# Patient Record
Sex: FEMALE | Race: BLACK OR AFRICAN AMERICAN | NOT HISPANIC OR LATINO
[De-identification: names, ages, dates, MRNs, and addresses within clinical notes are randomized per-mention and may not be internally consistent; named-entity substitution may affect disease eponyms.]

---

## 2019-05-01 ENCOUNTER — TRANSCRIPTION ENCOUNTER (OUTPATIENT)
Age: 64
End: 2019-05-01

## 2019-08-01 ENCOUNTER — OUTPATIENT (OUTPATIENT)
Dept: OUTPATIENT SERVICES | Facility: HOSPITAL | Age: 64
LOS: 1 days | End: 2019-08-01
Payer: MEDICAID

## 2019-08-01 DIAGNOSIS — Z98.51 TUBAL LIGATION STATUS: Chronic | ICD-10-CM

## 2019-08-01 PROCEDURE — G9001: CPT

## 2019-08-23 ENCOUNTER — EMERGENCY (EMERGENCY)
Facility: HOSPITAL | Age: 64
LOS: 1 days | Discharge: HOME | End: 2019-08-23
Attending: EMERGENCY MEDICINE
Payer: COMMERCIAL

## 2019-08-23 ENCOUNTER — APPOINTMENT (OUTPATIENT)
Dept: CT IMAGING | Facility: CLINIC | Age: 64
End: 2019-08-23

## 2019-08-23 VITALS
WEIGHT: 126.1 LBS | HEIGHT: 67 IN | OXYGEN SATURATION: 100 % | SYSTOLIC BLOOD PRESSURE: 171 MMHG | TEMPERATURE: 99 F | DIASTOLIC BLOOD PRESSURE: 83 MMHG | HEART RATE: 101 BPM | RESPIRATION RATE: 20 BRPM

## 2019-08-23 DIAGNOSIS — Z98.51 TUBAL LIGATION STATUS: Chronic | ICD-10-CM

## 2019-08-23 LAB
ALBUMIN SERPL ELPH-MCNC: 4.4 G/DL — SIGNIFICANT CHANGE UP (ref 3.3–5.2)
ALP SERPL-CCNC: 67 U/L — SIGNIFICANT CHANGE UP (ref 40–120)
ALT FLD-CCNC: 12 U/L — SIGNIFICANT CHANGE UP
ANION GAP SERPL CALC-SCNC: 17 MMOL/L — SIGNIFICANT CHANGE UP (ref 5–17)
APPEARANCE UR: CLEAR — SIGNIFICANT CHANGE UP
AST SERPL-CCNC: 19 U/L — SIGNIFICANT CHANGE UP
BACTERIA # UR AUTO: NEGATIVE — SIGNIFICANT CHANGE UP
BASOPHILS # BLD AUTO: 0.03 K/UL — SIGNIFICANT CHANGE UP (ref 0–0.2)
BASOPHILS NFR BLD AUTO: 0.3 % — SIGNIFICANT CHANGE UP (ref 0–2)
BILIRUB SERPL-MCNC: 0.5 MG/DL — SIGNIFICANT CHANGE UP (ref 0.4–2)
BILIRUB UR-MCNC: NEGATIVE — SIGNIFICANT CHANGE UP
BUN SERPL-MCNC: 10 MG/DL — SIGNIFICANT CHANGE UP (ref 8–20)
CALCIUM SERPL-MCNC: 9.9 MG/DL — SIGNIFICANT CHANGE UP (ref 8.6–10.2)
CHLORIDE SERPL-SCNC: 98 MMOL/L — SIGNIFICANT CHANGE UP (ref 98–107)
CO2 SERPL-SCNC: 24 MMOL/L — SIGNIFICANT CHANGE UP (ref 22–29)
COLOR SPEC: YELLOW — SIGNIFICANT CHANGE UP
CREAT SERPL-MCNC: 0.68 MG/DL — SIGNIFICANT CHANGE UP (ref 0.5–1.3)
DIFF PNL FLD: ABNORMAL
EOSINOPHIL # BLD AUTO: 0.13 K/UL — SIGNIFICANT CHANGE UP (ref 0–0.5)
EOSINOPHIL NFR BLD AUTO: 1.2 % — SIGNIFICANT CHANGE UP (ref 0–6)
EPI CELLS # UR: SIGNIFICANT CHANGE UP
GLUCOSE SERPL-MCNC: 97 MG/DL — SIGNIFICANT CHANGE UP (ref 70–115)
GLUCOSE UR QL: NEGATIVE MG/DL — SIGNIFICANT CHANGE UP
HCG UR QL: NEGATIVE — SIGNIFICANT CHANGE UP
HCT VFR BLD CALC: 37.8 % — SIGNIFICANT CHANGE UP (ref 34.5–45)
HGB BLD-MCNC: 12.1 G/DL — SIGNIFICANT CHANGE UP (ref 11.5–15.5)
IMM GRANULOCYTES NFR BLD AUTO: 0.7 % — SIGNIFICANT CHANGE UP (ref 0–1.5)
KETONES UR-MCNC: NEGATIVE — SIGNIFICANT CHANGE UP
LEUKOCYTE ESTERASE UR-ACNC: NEGATIVE — SIGNIFICANT CHANGE UP
LIDOCAIN IGE QN: 18 U/L — LOW (ref 22–51)
LYMPHOCYTES # BLD AUTO: 1.48 K/UL — SIGNIFICANT CHANGE UP (ref 1–3.3)
LYMPHOCYTES # BLD AUTO: 13.9 % — SIGNIFICANT CHANGE UP (ref 13–44)
MCHC RBC-ENTMCNC: 29.4 PG — SIGNIFICANT CHANGE UP (ref 27–34)
MCHC RBC-ENTMCNC: 32 GM/DL — SIGNIFICANT CHANGE UP (ref 32–36)
MCV RBC AUTO: 91.7 FL — SIGNIFICANT CHANGE UP (ref 80–100)
MONOCYTES # BLD AUTO: 0.8 K/UL — SIGNIFICANT CHANGE UP (ref 0–0.9)
MONOCYTES NFR BLD AUTO: 7.5 % — SIGNIFICANT CHANGE UP (ref 2–14)
NEUTROPHILS # BLD AUTO: 8.13 K/UL — HIGH (ref 1.8–7.4)
NEUTROPHILS NFR BLD AUTO: 76.4 % — SIGNIFICANT CHANGE UP (ref 43–77)
NITRITE UR-MCNC: NEGATIVE — SIGNIFICANT CHANGE UP
PH UR: 6.5 — SIGNIFICANT CHANGE UP (ref 5–8)
PLATELET # BLD AUTO: 265 K/UL — SIGNIFICANT CHANGE UP (ref 150–400)
POTASSIUM SERPL-MCNC: 3.5 MMOL/L — SIGNIFICANT CHANGE UP (ref 3.5–5.3)
POTASSIUM SERPL-SCNC: 3.5 MMOL/L — SIGNIFICANT CHANGE UP (ref 3.5–5.3)
PROT SERPL-MCNC: 8.1 G/DL — SIGNIFICANT CHANGE UP (ref 6.6–8.7)
PROT UR-MCNC: 30 MG/DL
RBC # BLD: 4.12 M/UL — SIGNIFICANT CHANGE UP (ref 3.8–5.2)
RBC # FLD: 12.3 % — SIGNIFICANT CHANGE UP (ref 10.3–14.5)
RBC CASTS # UR COMP ASSIST: ABNORMAL /HPF (ref 0–4)
SODIUM SERPL-SCNC: 139 MMOL/L — SIGNIFICANT CHANGE UP (ref 135–145)
SP GR SPEC: 1 — LOW (ref 1.01–1.02)
UROBILINOGEN FLD QL: NEGATIVE MG/DL — SIGNIFICANT CHANGE UP
WBC # BLD: 10.64 K/UL — HIGH (ref 3.8–10.5)
WBC # FLD AUTO: 10.64 K/UL — HIGH (ref 3.8–10.5)
WBC UR QL: SIGNIFICANT CHANGE UP

## 2019-08-23 PROCEDURE — 74177 CT ABD & PELVIS W/CONTRAST: CPT | Mod: 26

## 2019-08-23 PROCEDURE — 99284 EMERGENCY DEPT VISIT MOD MDM: CPT | Mod: 25

## 2019-08-23 PROCEDURE — 99284 EMERGENCY DEPT VISIT MOD MDM: CPT

## 2019-08-23 PROCEDURE — 83690 ASSAY OF LIPASE: CPT

## 2019-08-23 PROCEDURE — 80053 COMPREHEN METABOLIC PANEL: CPT

## 2019-08-23 PROCEDURE — 81001 URINALYSIS AUTO W/SCOPE: CPT

## 2019-08-23 PROCEDURE — 36415 COLL VENOUS BLD VENIPUNCTURE: CPT

## 2019-08-23 PROCEDURE — 96360 HYDRATION IV INFUSION INIT: CPT

## 2019-08-23 PROCEDURE — 74177 CT ABD & PELVIS W/CONTRAST: CPT

## 2019-08-23 PROCEDURE — 81025 URINE PREGNANCY TEST: CPT

## 2019-08-23 PROCEDURE — 96361 HYDRATE IV INFUSION ADD-ON: CPT

## 2019-08-23 PROCEDURE — 85027 COMPLETE CBC AUTOMATED: CPT

## 2019-08-23 RX ORDER — OXYCODONE AND ACETAMINOPHEN 5; 325 MG/1; MG/1
1 TABLET ORAL
Qty: 6 | Refills: 0
Start: 2019-08-23 | End: 2019-08-25

## 2019-08-23 RX ORDER — METRONIDAZOLE 500 MG
500 TABLET ORAL ONCE
Refills: 0 | Status: COMPLETED | OUTPATIENT
Start: 2019-08-23 | End: 2019-08-23

## 2019-08-23 RX ORDER — METRONIDAZOLE 500 MG
1 TABLET ORAL
Qty: 30 | Refills: 0
Start: 2019-08-23 | End: 2019-09-01

## 2019-08-23 RX ORDER — SODIUM CHLORIDE 9 MG/ML
1000 INJECTION INTRAMUSCULAR; INTRAVENOUS; SUBCUTANEOUS ONCE
Refills: 0 | Status: COMPLETED | OUTPATIENT
Start: 2019-08-23 | End: 2019-08-23

## 2019-08-23 RX ADMIN — SODIUM CHLORIDE 2000 MILLILITER(S): 9 INJECTION INTRAMUSCULAR; INTRAVENOUS; SUBCUTANEOUS at 15:19

## 2019-08-23 RX ADMIN — SODIUM CHLORIDE 1000 MILLILITER(S): 9 INJECTION INTRAMUSCULAR; INTRAVENOUS; SUBCUTANEOUS at 19:14

## 2019-08-23 RX ADMIN — Medication 500 MILLIGRAM(S): at 19:13

## 2019-08-23 RX ADMIN — Medication 1 TABLET(S): at 19:39

## 2019-08-23 NOTE — ED STATDOCS - OBJECTIVE STATEMENT
Pt is a 62 y/o F, with PMHx of HTN, presenting to the ED with c/o urinary sxs for the last three days. Pt reports sxs of urinary frequency, dysuria, and left pelvic pain. She went to urgent care regarding sxs but was referred to the ED for further eval. Pt notes she is not eating or drinking well since onset of sxs and has been constipated. Denies nausea, vomiting, and fever. Hx of UTI in the past but states this episode is associated with pain, which she usually does not have.

## 2019-08-23 NOTE — ED STATDOCS - GASTROINTESTINAL, MLM
LLQ abd TTP, soft,, and non-distended. Bowel sounds present. tympanitic to percussion, no CVAT LLQ abd TTP, soft,, and non-distended. Bowel sounds present. tympanitic to percussion.

## 2019-08-23 NOTE — ED STATDOCS - PROGRESS NOTE DETAILS
PT evaluated by intake physician. HPI/ROS/PE as noted above. Will follow up plan per intake physician Results discussed. Tolerating PO in ED. precautions discussed. follow up discussed.

## 2019-08-23 NOTE — ED STATDOCS - NS_ ATTENDINGSCRIBEDETAILS _ED_A_ED_FT
I, Zhao Bravo, performed the initial face to face bedside interview with this patient regarding history of present illness, review of symptoms and relevant past medical, social and family history.  I completed an independent physical examination.  I was the provider who initially evaluated this patient.  The history, relevant review of systems, past medical and surgical history, medical decision making, and physical examination was documented by the scribe in my presence and I attest to the accuracy of the documentation. Follow-up on ordered tests (ie labs, radiologic studies) and re-evaluation of the patient's status has been communicated to the ACP.  Disposition of the patient will be based on test outcome and response to ED interventions.

## 2019-08-23 NOTE — ED ADULT NURSE REASSESSMENT NOTE - NS ED NURSE REASSESS COMMENT FT1
pt hemodynamically stable, PIV removed, refer to flowsheet and chart, pt to be discharged, pt understood discharge instructions and plan of care. Pt medically cleared by MD Bravo.

## 2019-08-26 DIAGNOSIS — Z71.89 OTHER SPECIFIED COUNSELING: ICD-10-CM

## 2019-09-20 ENCOUNTER — APPOINTMENT (OUTPATIENT)
Dept: GASTROENTEROLOGY | Facility: CLINIC | Age: 64
End: 2019-09-20

## 2019-09-26 ENCOUNTER — TRANSCRIPTION ENCOUNTER (OUTPATIENT)
Age: 64
End: 2019-09-26

## 2019-12-24 ENCOUNTER — TRANSCRIPTION ENCOUNTER (OUTPATIENT)
Age: 64
End: 2019-12-24

## 2021-07-13 ENCOUNTER — EMERGENCY (EMERGENCY)
Facility: HOSPITAL | Age: 66
LOS: 1 days | Discharge: HOME | End: 2021-07-13
Attending: EMERGENCY MEDICINE
Payer: COMMERCIAL

## 2021-07-13 VITALS
HEART RATE: 97 BPM | WEIGHT: 139.99 LBS | TEMPERATURE: 98 F | SYSTOLIC BLOOD PRESSURE: 118 MMHG | DIASTOLIC BLOOD PRESSURE: 54 MMHG | RESPIRATION RATE: 20 BRPM | OXYGEN SATURATION: 98 % | HEIGHT: 67 IN

## 2021-07-13 DIAGNOSIS — Z98.51 TUBAL LIGATION STATUS: Chronic | ICD-10-CM

## 2021-07-13 LAB
ALBUMIN SERPL ELPH-MCNC: 4.5 G/DL — SIGNIFICANT CHANGE UP (ref 3.3–5.2)
ALP SERPL-CCNC: 64 U/L — SIGNIFICANT CHANGE UP (ref 40–120)
ALT FLD-CCNC: 19 U/L — SIGNIFICANT CHANGE UP
ANION GAP SERPL CALC-SCNC: 13 MMOL/L — SIGNIFICANT CHANGE UP (ref 5–17)
APTT BLD: 30 SEC — SIGNIFICANT CHANGE UP (ref 27.5–35.5)
AST SERPL-CCNC: 35 U/L — HIGH
BASOPHILS # BLD AUTO: 0.02 K/UL — SIGNIFICANT CHANGE UP (ref 0–0.2)
BASOPHILS NFR BLD AUTO: 0.3 % — SIGNIFICANT CHANGE UP (ref 0–2)
BILIRUB SERPL-MCNC: 0.4 MG/DL — SIGNIFICANT CHANGE UP (ref 0.4–2)
BUN SERPL-MCNC: 13.5 MG/DL — SIGNIFICANT CHANGE UP (ref 8–20)
CALCIUM SERPL-MCNC: 9.7 MG/DL — SIGNIFICANT CHANGE UP (ref 8.6–10.2)
CHLORIDE SERPL-SCNC: 102 MMOL/L — SIGNIFICANT CHANGE UP (ref 98–107)
CO2 SERPL-SCNC: 23 MMOL/L — SIGNIFICANT CHANGE UP (ref 22–29)
CREAT SERPL-MCNC: 0.77 MG/DL — SIGNIFICANT CHANGE UP (ref 0.5–1.3)
EOSINOPHIL # BLD AUTO: 0.06 K/UL — SIGNIFICANT CHANGE UP (ref 0–0.5)
EOSINOPHIL NFR BLD AUTO: 0.8 % — SIGNIFICANT CHANGE UP (ref 0–6)
GLUCOSE SERPL-MCNC: 92 MG/DL — SIGNIFICANT CHANGE UP (ref 70–99)
HCT VFR BLD CALC: 37.1 % — SIGNIFICANT CHANGE UP (ref 34.5–45)
HGB BLD-MCNC: 12.2 G/DL — SIGNIFICANT CHANGE UP (ref 11.5–15.5)
IMM GRANULOCYTES NFR BLD AUTO: 0.3 % — SIGNIFICANT CHANGE UP (ref 0–1.5)
INR BLD: 1.05 RATIO — SIGNIFICANT CHANGE UP (ref 0.88–1.16)
LYMPHOCYTES # BLD AUTO: 2.27 K/UL — SIGNIFICANT CHANGE UP (ref 1–3.3)
LYMPHOCYTES # BLD AUTO: 30.6 % — SIGNIFICANT CHANGE UP (ref 13–44)
MAGNESIUM SERPL-MCNC: 1.8 MG/DL — SIGNIFICANT CHANGE UP (ref 1.6–2.6)
MCHC RBC-ENTMCNC: 29.4 PG — SIGNIFICANT CHANGE UP (ref 27–34)
MCHC RBC-ENTMCNC: 32.9 GM/DL — SIGNIFICANT CHANGE UP (ref 32–36)
MCV RBC AUTO: 89.4 FL — SIGNIFICANT CHANGE UP (ref 80–100)
MONOCYTES # BLD AUTO: 0.52 K/UL — SIGNIFICANT CHANGE UP (ref 0–0.9)
MONOCYTES NFR BLD AUTO: 7 % — SIGNIFICANT CHANGE UP (ref 2–14)
NEUTROPHILS # BLD AUTO: 4.53 K/UL — SIGNIFICANT CHANGE UP (ref 1.8–7.4)
NEUTROPHILS NFR BLD AUTO: 61 % — SIGNIFICANT CHANGE UP (ref 43–77)
NT-PROBNP SERPL-SCNC: 35 PG/ML — SIGNIFICANT CHANGE UP (ref 0–300)
PLATELET # BLD AUTO: 293 K/UL — SIGNIFICANT CHANGE UP (ref 150–400)
POTASSIUM SERPL-MCNC: 4.9 MMOL/L — SIGNIFICANT CHANGE UP (ref 3.5–5.3)
POTASSIUM SERPL-SCNC: 4.9 MMOL/L — SIGNIFICANT CHANGE UP (ref 3.5–5.3)
PROT SERPL-MCNC: 7.9 G/DL — SIGNIFICANT CHANGE UP (ref 6.6–8.7)
PROTHROM AB SERPL-ACNC: 12.2 SEC — SIGNIFICANT CHANGE UP (ref 10.6–13.6)
RBC # BLD: 4.15 M/UL — SIGNIFICANT CHANGE UP (ref 3.8–5.2)
RBC # FLD: 12.7 % — SIGNIFICANT CHANGE UP (ref 10.3–14.5)
SODIUM SERPL-SCNC: 138 MMOL/L — SIGNIFICANT CHANGE UP (ref 135–145)
TROPONIN T SERPL-MCNC: <0.01 NG/ML — SIGNIFICANT CHANGE UP (ref 0–0.06)
WBC # BLD: 7.42 K/UL — SIGNIFICANT CHANGE UP (ref 3.8–10.5)
WBC # FLD AUTO: 7.42 K/UL — SIGNIFICANT CHANGE UP (ref 3.8–10.5)

## 2021-07-13 PROCEDURE — 74174 CTA ABD&PLVS W/CONTRAST: CPT | Mod: 26,MA

## 2021-07-13 PROCEDURE — 71045 X-RAY EXAM CHEST 1 VIEW: CPT | Mod: 26

## 2021-07-13 PROCEDURE — 99284 EMERGENCY DEPT VISIT MOD MDM: CPT

## 2021-07-13 PROCEDURE — 71275 CT ANGIOGRAPHY CHEST: CPT | Mod: 26,MA

## 2021-07-13 PROCEDURE — 99220: CPT

## 2021-07-13 PROCEDURE — 93010 ELECTROCARDIOGRAM REPORT: CPT | Mod: 76

## 2021-07-13 RX ORDER — LOSARTAN POTASSIUM 100 MG/1
25 TABLET, FILM COATED ORAL DAILY
Refills: 0 | Status: DISCONTINUED | OUTPATIENT
Start: 2021-07-13 | End: 2021-07-18

## 2021-07-13 RX ORDER — LABETALOL HCL 100 MG
200 TABLET ORAL EVERY 12 HOURS
Refills: 0 | Status: DISCONTINUED | OUTPATIENT
Start: 2021-07-13 | End: 2021-07-18

## 2021-07-13 RX ORDER — ASPIRIN/CALCIUM CARB/MAGNESIUM 324 MG
324 TABLET ORAL ONCE
Refills: 0 | Status: COMPLETED | OUTPATIENT
Start: 2021-07-13 | End: 2021-07-13

## 2021-07-13 RX ADMIN — Medication 200 MILLIGRAM(S): at 17:06

## 2021-07-13 RX ADMIN — LOSARTAN POTASSIUM 25 MILLIGRAM(S): 100 TABLET, FILM COATED ORAL at 17:06

## 2021-07-13 RX ADMIN — Medication 324 MILLIGRAM(S): at 14:58

## 2021-07-13 NOTE — ED PROVIDER NOTE - ATTENDING CONTRIBUTION TO CARE
I performed the initial face to face bedside interview with this patient regarding history of present illness, review of symptoms and past medical, social and family history.  I completed an independent physical examination.  I was the initial provider who evaluated this patient.  The history, review of symptoms and examination was documented by the scribe in my presence and I attest to the accuracy of the documentation.    The resident will be participating in the care of this patient under my direct supervision.

## 2021-07-13 NOTE — ED CDU PROVIDER INITIAL DAY NOTE - ATTENDING CONTRIBUTION TO CARE
HPI: 66yo F with h/o HTN, UTI presentign with exertional nonradiating chest pain that started while working, a/w MORATAYA.     PE:  Gen: NAD  Head: NCAT  HEENT: Oral mucosa moist, normal conjunctiva  CV: RRR no murmurs, normal perfusion  Resp: CTA b/l, no wheezing, rales, rhonchi, no respiratory distress  GI: Abd Soft NTND  Neuro: No focal neuro deficits  MSK: FROM all 4 extremities, no deformity  Skin: No rash, no bruising  Psych: Normal affect    MDM: Placed with chest pain r/o acute coronary syndrome, CTA neg for aortic dissection/PE, plan for NST, TTE. Deloris Suarez DO     I performed a history and physical exam of the patient and discussed their management with the advanced care provider. I reviewed the advanced care provider's note and agree with the documented findings and plan of care. My medical decision making and objective findings are found above.

## 2021-07-13 NOTE — ED ADULT NURSE NOTE - OBJECTIVE STATEMENT
pt alert and oriented x4 comes in c/o mid sternal chest pain starting today. RR even unlabored. pt states worsening when lifting left arm. pt on cardiac monitor. pt educated on plan of care, pt able to successfully teach back plan of care to RN, RN will continue to reeducate pt during hospital stay.

## 2021-07-13 NOTE — ED ADULT NURSE NOTE - NS ED NURSE LEVEL OF CONSCIOUSNESS AFFECT
The patient is better however she still has periods of bloating.  I would like her to consistently take the probiotic [employer] colon health once every morning.  I am also asking her to take her bromelin 500 mg with each meal.  So asking her to call me in 4 weeks to check in with me.  In addition if  something is bothering her I would ask her to call me to discuss this.The patient and/or guardian has been provided with verbal or written information pertinent to the reason for today's visit, and demonstrates an understanding of what they have received.I have spent 30 minutes of face to face time with this patient in which greater than 50% was spent in counseling and coordination of care.I have spent 30 minutes of face to face time with this patient in which greater than 50% was spent in counseling and coordination of care.   Calm

## 2021-07-13 NOTE — ED ADULT NURSE NOTE - CAS ELECT INFOMATION PROVIDED
DC instructions provided and reviewed with patient. Patient in understanding of all dc instructions. No further questions regarding DC for the RN. Ambulatory with steady gait./DC instructions

## 2021-07-13 NOTE — CONSULT NOTE ADULT - ASSESSMENT
64 yo female who has not seen a doctor, present with one day of left sided chest discomfort, worst on body movement. No SOB.  BP is elevated. CE and serum pro-BNP is negative.  Pt is comfortable now and has slight background pain, unchanged from previous day.   EKG with RBBB, and lack of R wave in leads v1 and v2.   Due to elevated BP, asked ED to perform CT scan to exclude dissection of aorta.  Start BP meds.  Recycle CE and EKG  TTE tonight.  Pt does not behave as if she has PE, no SOB, sat is ok, and Hr of 80.  If above testing is unrevealing, we will consider doing ischemic workup.   Check lipid panel in am, hgba1c.  We will follow with you.

## 2021-07-13 NOTE — ED ADULT NURSE REASSESSMENT NOTE - NS ED NURSE REASSESS COMMENT FT1
Pt alert and oriented x 4, c/o of slight left chest pain- echo done, troponin sent. SR in the 60's on monitor.  Vitals stable. Skin intact. NPO after midnight for further test.  Safety precautions in place. Will continue to monitor Pt alert and oriented x 4, c/o of slight left chest pain- echo done, troponin sent. SR in the 60's on monitor. Will monitor BP. Skin intact. NPO after midnight for further test.  Safety precautions in place. Will continue to monitor.

## 2021-07-13 NOTE — CONSULT NOTE ADULT - SUBJECTIVE AND OBJECTIVE BOX
Patient is a 65y old  Female who presents with a chief complaint of chest pain.     HPI:  64 yo female, not seen a physician for at least 3 years. She started to have left sided chest discomfort, sharp in nature and no radiation since yesterday. If she bent forward with her arm extended, the pain would increase in intensity. No SOB. Symptoms constant since yesterday. No palpitations syncope or presyncope. Palpation of the area does not reproduce her pain. She has not had any similar symptoms in the past.  No calf tenderness.     PAST MEDICAL & SURGICAL HISTORY:  HTN (hypertension)  Urinary tract infection  H/O tubal ligation    Allergies  No Known Allergies    MEDICATIONS  (STANDING):  labetalol 200 milliGRAM(s) Oral every 12 hours  losartan 25 milliGRAM(s) Oral daily    MEDICATIONS  (PRN):    FAMILY HISTORY:  Family history of hypertension (Father, Mother)  Mother with MI,  in her 60's.    SOCIAL HISTORY:  Tobacco: 5 cigs per day.   ALCOHOL: Occasional   Illicit drugs: Marijuana    REVIEW OF SYSTEMS:  CONSTITUTIONAL: No fever, weight loss, or fatigue  EYES: No eye pain, visual disturbances, or discharge  ENMT:  No difficulty hearing, tinnitus, vertigo; No sinus or throat pain  NECK: No pain or stiffness  RESPIRATORY: No cough, wheezing, chills or hemoptysis; No Shortness of Breath  CARDIOVASCULAR: No palpitations, syncope or presyncope , dizziness, or leg swelling, see HPI  GASTROINTESTINAL: No abdominal pain , nausea, vomiting, or hematemesis; No diarrhea or constipation. No melena or hematochezia.  GENITOURINARY: No dysuria, polyuria, hematuria, or incontinence  NEUROLOGICAL: No headaches, memory loss, loss of strength, numbness, or tremors  SKIN: No itching, burning, rashes,  bruising   MUSCULOSKELETAL: No joint pain or swelling; No muscle, back, or extremity pain  PSYCHIATRIC: No depression, anxiety, mood swings, or difficulty sleeping  HEME/LYMPH: No easy bruising, or bleeding   ALLERY AND IMMUNOLOGIC: No hives or eczema	    Vital Signs Last 24 Hrs  T(C): 36.7 (2021 13:48), Max: 36.7 (2021 13:48)  T(F): 98 (2021 13:48), Max: 98 (2021 13:48)  HR: 97 (2021 13:48) (97 - 97)  BP: 170/86 left arm and 182/92 right arm   RR: 20 (2021 13:48) (20 - 20)  SpO2: 98% (2021 13:48) (98% - 98%)  Daily Height in cm: 170.18 (2021 13:48)      PHYSICAL EXAM:  Appearance: Normal, well nourished, in NAD	  HEENT:   Normal oral mucosa, PERRL, EOMI, sclera non-icteric	  NecK: No JVD, no bruits, no LAD  Cardiovascular: Normal S1 S2, No JVD, No cardiac murmurs, No peripheral edema  Respiratory: Lungs clear to auscultation	  Psychiatry: A & O x 3, Mood & affect appropriate  Gastrointestinal:  Soft, Non-tender, + BS, no bruits	  Skin: No rashes, No ecchymoses, No cyanosis  Neurologic: Grossly non-focal with full strength in all four extremities  Extremities: Normal range of motion, No clubbing, no cyanosis  Vascular: Peripheral pulses palpable, no radio-femoral delay.    INTERPRETATION OF TELEMETRY:  ECG: NSR, RBBB, possible septal infarct.    LABS:                        12.2   7.42  )-----------( 293      ( 2021 15:18 )             37.1     07-13    138  |  102  |  13.5  ----------------------------<  92  4.9   |  23.0  |  0.77    Ca    9.7      2021 15:18  Mg     1.8     07-13    TPro  7.9  /  Alb  4.5  /  TBili  0.4  /  DBili  x   /  AST  35<H>  /  ALT  19  /  AlkPhos  64  07-13  CARDIAC MARKERS ( 2021 15:18 )  x     / <0.01 ng/mL / x     / x     / x      PT/INR - ( 2021 15:18 )   PT: 12.2 sec;   INR: 1.05 ratio   PTT - ( 2021 15:18 )  PTT:30.0 sec  I&O's Summary  Serum Pro-Brain Natriuretic Peptide: 35 pg/mL (21 @ 15:18)    RADIOLOGY & ADDITIONAL STUDIES:  CXR no report available. Visually no cardiopulmonary disease. Calcification of aorta .

## 2021-07-13 NOTE — ED PROVIDER NOTE - NS ED ROS FT
Constitutional: (-) fever  (-)chills  (-)sweats  Eyes/ENT: (-) blurry vision, (-) epistaxis  (-)rhinorrhea   (-) sore throat    Cardiovascular: (+) chest pain, (-) palpitations (-) edema   Respiratory: (-) cough, (-) shortness of breath (+) MORATAYA  Gastrointestinal: (-)nausea  (-)vomiting, (-) diarrhea  (-) abdominal pain   :  (-)dysuria, (-)frequency, (-)urgency, (-)hematuria  Musculoskeletal: (-) neck pain, (-) back pain, (-) joint pain  Integumentary: (-) rash, (-) edema  Neurological: (-) headache, (-) altered mental status  (-)LOC

## 2021-07-13 NOTE — ED CDU PROVIDER INITIAL DAY NOTE - MEDICAL DECISION MAKING DETAILS
PT with CP placed in obs for Diagnostic uncertainty. PT seen BY OBS PA found to be appropriate CDU PT care transferred to PA.

## 2021-07-13 NOTE — ED PROVIDER NOTE - OBJECTIVE STATEMENT
HPI:  64 y/o F; denies PMH; now p/w chest pain. Pt states pain started yesterday while working. Pt denies past surgeries. Pt denies family hx of cardiac problems. Pt endorses mild MORATAYA. Pt states she has not seen a doctor in a while.    PMH: denies   SOCIAL: +social EtOH use, + tobacco use 5-6 cigarettes a day/ denies illicit drug use HPI:  64 y/o F; denies PMH; now p/w chest pain--left sided chest pain, non-radiating, exertional (while walking), non-pleuritic, asscoiated with mild morataya.  denies cough. denies f/c/s. . Pt states pain started yesterday while working. Pt denies past surgeries. Pt denies family hx of cardiac problems. Pt endorses mild MORATAYA. Pt states she has not seen a doctor in a while.  PMH: denies   SOCIAL: +social EtOH use, + tobacco use 5-6 cigarettes a day/ denies illicit drug use

## 2021-07-13 NOTE — ED PROVIDER NOTE - PROGRESS NOTE DETAILS
Anant: Pt evaluated by [facility].  CTA negative for dissection.  BP improved to 162/70 with PO medication.  Will place in observation for further monitoring and workup.

## 2021-07-13 NOTE — ED ADULT NURSE REASSESSMENT NOTE - NS ED NURSE REASSESS COMMENT FT1
received care of patient at 15:00, cardiac monitor/ remains in place. patient awaiting repeat troponin at 18:00 and is to be placed on observation for cardiology consult. plan of care reviewed with patient, verbalizes understanding at this time.

## 2021-07-13 NOTE — ED CDU PROVIDER INITIAL DAY NOTE - OBJECTIVE STATEMENT
HPI:  64 y/o F; denies PMH; now p/w chest pain--left sided chest pain, non-radiating, exertional (while walking), non-pleuritic, asscoiated with mild morataya.  denies cough. denies f/c/s. . Pt states pain started yesterday while working. Pt denies past surgeries. Pt denies family hx of cardiac problems. Pt endorses mild MORATAYA. Pt states she has not seen a doctor in a while.  PMH: denies   SOCIAL: +social EtOH use, + tobacco use 5-6 cigarettes a day/ denies illicit drug use

## 2021-07-14 VITALS
RESPIRATION RATE: 20 BRPM | SYSTOLIC BLOOD PRESSURE: 158 MMHG | HEART RATE: 70 BPM | OXYGEN SATURATION: 99 % | TEMPERATURE: 99 F | DIASTOLIC BLOOD PRESSURE: 64 MMHG

## 2021-07-14 LAB
CHOLEST SERPL-MCNC: 230 MG/DL — HIGH
HDLC SERPL-MCNC: 79 MG/DL — SIGNIFICANT CHANGE UP
LIPID PNL WITH DIRECT LDL SERPL: 129 MG/DL — HIGH
NON HDL CHOLESTEROL: 151 MG/DL — HIGH
TRIGL SERPL-MCNC: 109 MG/DL — SIGNIFICANT CHANGE UP

## 2021-07-14 PROCEDURE — 96374 THER/PROPH/DIAG INJ IV PUSH: CPT

## 2021-07-14 PROCEDURE — 83735 ASSAY OF MAGNESIUM: CPT

## 2021-07-14 PROCEDURE — 74174 CTA ABD&PLVS W/CONTRAST: CPT

## 2021-07-14 PROCEDURE — 80053 COMPREHEN METABOLIC PANEL: CPT

## 2021-07-14 PROCEDURE — A9500: CPT

## 2021-07-14 PROCEDURE — 84484 ASSAY OF TROPONIN QUANT: CPT

## 2021-07-14 PROCEDURE — 99284 EMERGENCY DEPT VISIT MOD MDM: CPT | Mod: 25

## 2021-07-14 PROCEDURE — 93018 CV STRESS TEST I&R ONLY: CPT

## 2021-07-14 PROCEDURE — 83880 ASSAY OF NATRIURETIC PEPTIDE: CPT

## 2021-07-14 PROCEDURE — 85730 THROMBOPLASTIN TIME PARTIAL: CPT

## 2021-07-14 PROCEDURE — 99214 OFFICE O/P EST MOD 30 MIN: CPT

## 2021-07-14 PROCEDURE — 93017 CV STRESS TEST TRACING ONLY: CPT

## 2021-07-14 PROCEDURE — 71275 CT ANGIOGRAPHY CHEST: CPT

## 2021-07-14 PROCEDURE — 93005 ELECTROCARDIOGRAM TRACING: CPT

## 2021-07-14 PROCEDURE — 78452 HT MUSCLE IMAGE SPECT MULT: CPT

## 2021-07-14 PROCEDURE — 78452 HT MUSCLE IMAGE SPECT MULT: CPT | Mod: 26

## 2021-07-14 PROCEDURE — 80061 LIPID PANEL: CPT

## 2021-07-14 PROCEDURE — 71045 X-RAY EXAM CHEST 1 VIEW: CPT

## 2021-07-14 PROCEDURE — 93016 CV STRESS TEST SUPVJ ONLY: CPT

## 2021-07-14 PROCEDURE — G0378: CPT

## 2021-07-14 PROCEDURE — 85025 COMPLETE CBC W/AUTO DIFF WBC: CPT

## 2021-07-14 PROCEDURE — 36415 COLL VENOUS BLD VENIPUNCTURE: CPT

## 2021-07-14 PROCEDURE — 99217: CPT

## 2021-07-14 PROCEDURE — C8929: CPT

## 2021-07-14 PROCEDURE — 85610 PROTHROMBIN TIME: CPT

## 2021-07-14 RX ORDER — METOPROLOL TARTRATE 50 MG
50 TABLET ORAL ONCE
Refills: 0 | Status: DISCONTINUED | OUTPATIENT
Start: 2021-07-14 | End: 2021-07-14

## 2021-07-14 RX ORDER — ATORVASTATIN CALCIUM 80 MG/1
10 TABLET, FILM COATED ORAL AT BEDTIME
Refills: 0 | Status: DISCONTINUED | OUTPATIENT
Start: 2021-07-14 | End: 2021-07-18

## 2021-07-14 RX ORDER — KETOROLAC TROMETHAMINE 30 MG/ML
30 SYRINGE (ML) INJECTION ONCE
Refills: 0 | Status: DISCONTINUED | OUTPATIENT
Start: 2021-07-14 | End: 2021-07-14

## 2021-07-14 RX ORDER — METOPROLOL TARTRATE 50 MG
100 TABLET ORAL ONCE
Refills: 0 | Status: DISCONTINUED | OUTPATIENT
Start: 2021-07-14 | End: 2021-07-14

## 2021-07-14 RX ADMIN — Medication 30 MILLIGRAM(S): at 08:56

## 2021-07-14 RX ADMIN — LOSARTAN POTASSIUM 25 MILLIGRAM(S): 100 TABLET, FILM COATED ORAL at 05:00

## 2021-07-14 RX ADMIN — Medication 200 MILLIGRAM(S): at 05:00

## 2021-07-14 NOTE — ED ADULT NURSE REASSESSMENT NOTE - GENERAL PATIENT STATE
comfortable appearance
comfortable appearance/cooperative
comfortable appearance/cooperative

## 2021-07-14 NOTE — PROGRESS NOTE ADULT - SUBJECTIVE AND OBJECTIVE BOX
CHIEF COMPLAINT:Patient is a 65y old  Female who presents with a chief complaint of chest pain.    INTERVAL HISTORY:    pt is still having chest discomfort left sided. Mostly with body movement.   But has a background pain, sharp in nature.    Allergies  No Known Allergies  	  MEDICATIONS:  labetalol 200 milliGRAM(s) Oral every 12 hours  losartan 25 milliGRAM(s) Oral daily  atorvastatin 10 milliGRAM(s) Oral at bedtime    PHYSICAL EXAM:  T(C): 36.5 (07-14-21 @ 07:27), Max: 37.2 (07-14-21 @ 00:04)  HR: 76 (07-14-21 @ 07:27) (68 - 97)  BP: 162/71 (07-14-21 @ 07:27) (118/54 - 206/94)  RR: 18 (07-14-21 @ 07:27) (18 - 20)  SpO2: 100% (07-14-21 @ 07:27) (97% - 100%)  Appearance: Normal	  HEENT:   NC/AT  Eye: Pink Conjunctiva  Lungs: CTA B/L  CVS: RRR, Normal S1 and S2, No Edema, palpation of ant chest wall increase her symptoms  Pulses: Normal distal pulses.  Neuro: A&O x3    TELEMETRY: no events    LABS:	 	                        12.2   7.42  )-----------( 293      ( 13 Jul 2021 15:18 )             37.1     07-13    138  |  102  |  13.5  ----------------------------<  92  4.9   |  23.0  |  0.77    Ca    9.7      13 Jul 2021 15:18  Mg     1.8     07-13    TPro  7.9  /  Alb  4.5  /  TBili  0.4  /  DBili  x   /  AST  35<H>  /  ALT  19  /  AlkPhos  64  07-13    proBNP: Serum Pro-Brain Natriuretic Peptide: 35 pg/mL (07-13 @ 15:18)    Lipid Profile:   HgA1c:   TSH:     ASSESSMENT/PLAN:

## 2021-07-14 NOTE — ED ADULT NURSE REASSESSMENT NOTE - ANCILLARY STATUS
lab results pending/awaiting radiology
lab results pending/awaiting radiology/cardiovascular tests pending
pending ECHO results/cardiovascular tests pending
NST/cardiovascular tests pending

## 2021-07-14 NOTE — PROGRESS NOTE ADULT - ASSESSMENT
BP is better controlled  No PE or dissection on CT scan  Stress test today for CP  Will review echo from yesterday.  Add lipitor  toradol for pain, component of MSK pain.

## 2021-07-14 NOTE — ED CDU PROVIDER DISPOSITION NOTE - CARE PROVIDER_API CALL
Wicho Villa)  Cardiovascular Disease  [address], [place] [address]  Phone: [phone]  Fax: [phone]  Follow Up Time:

## 2021-07-14 NOTE — ED ADULT NURSE REASSESSMENT NOTE - NS ED NURSE REASSESS COMMENT FT1
Pt alert and oriented x 4, no c/o of pain or discomfort. BP elevated will give am BP meds. Will continue to monitor Pt alert and oriented x 4, BP elevated will give am BP meds. Will continue to monitor

## 2021-07-14 NOTE — ED CDU PROVIDER SUBSEQUENT DAY NOTE - ATTENDING CONTRIBUTION TO CARE
I agree with the PA's note and was available for any issues/concerns. I was directly involved in patient care. My brief overall assessment is as follows:    no acute events overnight; patient pending cardiac work up

## 2021-07-14 NOTE — ED CDU PROVIDER DISPOSITION NOTE - CLINICAL COURSE
This is a 66 yo female, not seen a physician for at least 3 years. She started to have left sided chest discomfort, sharp in nature and no radiation since yesterday. If she bent forward with her arm extended, the pain would increase in intensity. No SOB. Symptoms constant since yesterday. No palpitations syncope or presyncope. Palpation of the area does not reproduce her pain. She has not had any similar symptoms in the past.  No calf tenderness. Patient placed into observation for chest pain pathway, had serial troponins negative with EKGs revealing no ischemic changes.  Seen by cardiology recommended echo and NST.  Unremarkable studies, although NST offical read not in computer, verbally discussed with MD flood and RAZ Coreas, stable for outpatient follow up.  Given copies of all results, understands and agrees to proceed.

## 2021-07-14 NOTE — ED ADULT NURSE REASSESSMENT NOTE - COMFORT CARE
ambulated to bathroom/plan of care explained
plan of care explained
plan of care explained
ambulated to bathroom/plan of care explained/repositioned/wait time explained

## 2021-07-14 NOTE — ED CDU PROVIDER DISPOSITION NOTE - ATTENDING CONTRIBUTION TO CARE
I agree with the PA's note and was available for any issues/concerns. I was directly involved in patient care. My brief overall assessment is as follows:    labs and diagnostic imaging results reviewed with patient; cardiology assessment noted; patient feels comfortable with discharge and medical plan; PMD or clinic follow up recommended for reassessment. Patient is aware of signs/symptoms to return to the emergency department.

## 2021-07-14 NOTE — ED CDU PROVIDER DISPOSITION NOTE - PATIENT PORTAL LINK FT
You can access the FollowMyHealth Patient Portal offered by [facility] by registering at the following website: [email or web address]. By joining [employer]’s FollowMyHealth portal, you will also be able to view your health information using other applications (apps) compatible with our system.

## 2021-07-14 NOTE — ED CDU PROVIDER SUBSEQUENT DAY NOTE - HISTORY
PT placed in OBS, has had no acute incidents or complaints while in CDU PT is stable. PT Placed in OBS for ischemic eval of CP.

## 2021-07-14 NOTE — ED ADULT NURSE REASSESSMENT NOTE - NS ED NURSE REASSESS COMMENT FT1
Assumed care of the patient at 0730. Verbal report received from Morena SUBRAMANIAN ESSU. Patient A&Ox4. No s/s of acute distress. Patient with persistent Left sided CP, worse with coughing and palpation. Denies any dizziness or SOB. Sinus steve on CM. VSS. PIV patent. Patient pending NST this am. Remains NPO. Patient in understanding of plan of care. Patient with no further questions for the RN. Resting in comfort. Call bell within reach and encouraged to use when assistance needed. Will continue to monitor.

## 2021-07-26 ENCOUNTER — APPOINTMENT (OUTPATIENT)
Dept: CARDIOLOGY | Facility: CLINIC | Age: 66
End: 2021-07-26
Payer: MEDICAID

## 2021-07-26 ENCOUNTER — NON-APPOINTMENT (OUTPATIENT)
Age: 66
End: 2021-07-26

## 2021-07-26 VITALS
OXYGEN SATURATION: 100 % | HEART RATE: 72 BPM | BODY MASS INDEX: 27.23 KG/M2 | DIASTOLIC BLOOD PRESSURE: 58 MMHG | SYSTOLIC BLOOD PRESSURE: 138 MMHG | WEIGHT: 148 LBS | TEMPERATURE: 98.1 F | HEIGHT: 62 IN

## 2021-07-26 VITALS — DIASTOLIC BLOOD PRESSURE: 72 MMHG | SYSTOLIC BLOOD PRESSURE: 132 MMHG

## 2021-07-26 VITALS — SYSTOLIC BLOOD PRESSURE: 160 MMHG | DIASTOLIC BLOOD PRESSURE: 72 MMHG

## 2021-07-26 DIAGNOSIS — F17.210 NICOTINE DEPENDENCE, CIGARETTES, UNCOMPLICATED: ICD-10-CM

## 2021-07-26 DIAGNOSIS — Z82.49 FAMILY HISTORY OF ISCHEMIC HEART DISEASE AND OTHER DISEASES OF THE CIRCULATORY SYSTEM: ICD-10-CM

## 2021-07-26 DIAGNOSIS — Z78.9 OTHER SPECIFIED HEALTH STATUS: ICD-10-CM

## 2021-07-26 DIAGNOSIS — Z86.79 PERSONAL HISTORY OF OTHER DISEASES OF THE CIRCULATORY SYSTEM: ICD-10-CM

## 2021-07-26 DIAGNOSIS — Z87.440 PERSONAL HISTORY OF URINARY (TRACT) INFECTIONS: ICD-10-CM

## 2021-07-26 PROCEDURE — 93000 ELECTROCARDIOGRAM COMPLETE: CPT

## 2021-07-26 PROCEDURE — 99214 OFFICE O/P EST MOD 30 MIN: CPT

## 2021-08-09 ENCOUNTER — NON-APPOINTMENT (OUTPATIENT)
Age: 66
End: 2021-08-09

## 2021-08-09 VITALS — SYSTOLIC BLOOD PRESSURE: 154 MMHG | DIASTOLIC BLOOD PRESSURE: 72 MMHG

## 2021-08-09 VITALS
RESPIRATION RATE: 12 BRPM | DIASTOLIC BLOOD PRESSURE: 78 MMHG | SYSTOLIC BLOOD PRESSURE: 148 MMHG | OXYGEN SATURATION: 100 % | HEART RATE: 72 BPM

## 2021-08-27 ENCOUNTER — NON-APPOINTMENT (OUTPATIENT)
Age: 66
End: 2021-08-27

## 2021-09-17 VITALS
OXYGEN SATURATION: 100 % | HEART RATE: 84 BPM | DIASTOLIC BLOOD PRESSURE: 68 MMHG | SYSTOLIC BLOOD PRESSURE: 142 MMHG | RESPIRATION RATE: 12 BRPM

## 2021-10-14 ENCOUNTER — APPOINTMENT (OUTPATIENT)
Dept: CARDIOLOGY | Facility: CLINIC | Age: 66
End: 2021-10-14

## 2021-10-20 ENCOUNTER — APPOINTMENT (OUTPATIENT)
Dept: CARDIOLOGY | Facility: CLINIC | Age: 66
End: 2021-10-20

## 2021-11-17 ENCOUNTER — EMERGENCY (EMERGENCY)
Facility: HOSPITAL | Age: 66
LOS: 1 days | Discharge: HOME | End: 2021-11-17
Attending: EMERGENCY MEDICINE
Payer: COMMERCIAL

## 2021-11-17 VITALS
SYSTOLIC BLOOD PRESSURE: 190 MMHG | DIASTOLIC BLOOD PRESSURE: 83 MMHG | HEART RATE: 84 BPM | RESPIRATION RATE: 18 BRPM | TEMPERATURE: 98 F | OXYGEN SATURATION: 98 % | HEIGHT: 67 IN | WEIGHT: 139.99 LBS

## 2021-11-17 VITALS
RESPIRATION RATE: 16 BRPM | HEART RATE: 77 BPM | SYSTOLIC BLOOD PRESSURE: 182 MMHG | DIASTOLIC BLOOD PRESSURE: 82 MMHG | OXYGEN SATURATION: 96 %

## 2021-11-17 VITALS — DIASTOLIC BLOOD PRESSURE: 82 MMHG | SYSTOLIC BLOOD PRESSURE: 178 MMHG

## 2021-11-17 DIAGNOSIS — Z98.51 TUBAL LIGATION STATUS: Chronic | ICD-10-CM

## 2021-11-17 PROCEDURE — G1004: CPT

## 2021-11-17 PROCEDURE — 70450 CT HEAD/BRAIN W/O DYE: CPT | Mod: 26,MG

## 2021-11-17 PROCEDURE — 96372 THER/PROPH/DIAG INJ SC/IM: CPT

## 2021-11-17 PROCEDURE — 72125 CT NECK SPINE W/O DYE: CPT | Mod: MG

## 2021-11-17 PROCEDURE — 70450 CT HEAD/BRAIN W/O DYE: CPT | Mod: MG

## 2021-11-17 PROCEDURE — 99284 EMERGENCY DEPT VISIT MOD MDM: CPT | Mod: 25

## 2021-11-17 PROCEDURE — 99285 EMERGENCY DEPT VISIT HI MDM: CPT

## 2021-11-17 PROCEDURE — 72125 CT NECK SPINE W/O DYE: CPT | Mod: 26,MG

## 2021-11-17 RX ORDER — ACETAMINOPHEN 500 MG
2 TABLET ORAL
Qty: 40 | Refills: 0
Start: 2021-11-17 | End: 2021-11-21

## 2021-11-17 RX ORDER — DIAZEPAM 5 MG
1 TABLET ORAL
Qty: 6 | Refills: 0
Start: 2021-11-17 | End: 2021-11-19

## 2021-11-17 RX ORDER — KETOROLAC TROMETHAMINE 30 MG/ML
30 SYRINGE (ML) INJECTION ONCE
Refills: 0 | Status: DISCONTINUED | OUTPATIENT
Start: 2021-11-17 | End: 2021-11-17

## 2021-11-17 RX ADMIN — Medication 30 MILLIGRAM(S): at 19:33

## 2021-11-17 RX ADMIN — Medication 30 MILLIGRAM(S): at 17:39

## 2021-11-17 NOTE — ED ADULT NURSE NOTE - OBJECTIVE STATEMENT
Patient A&O x 3, presenting to the ED with complaints of numbness and tingling of the left arm  for 3-4 days. Patient denied N+V, headache, SOB, or changes the vision. Patient A&O x 3, presenting to the ED with complaints of numbness and tingling of the left arm  for 3-4 days. Patient denied N+V, headache, SOB, or changes the vision. Patient speaking in full sentences, no distress observed. Pain was rated a 7 out of 10.

## 2021-11-17 NOTE — ED PROVIDER NOTE - OBJECTIVE STATEMENT
67 y/o female comes in c/o numbness to left arm for few days x three   states her sister  on friday and has been under a lot of stress  no chest pain , no shortness of breath

## 2021-11-17 NOTE — ED PROVIDER NOTE - PATIENT PORTAL LINK FT
You can access the FollowMyHealth Patient Portal offered by [facility] by registering at the following website: [email or web address]. By joining [employer]’s FollowMyHealth portal, you will also be able to view your health information using other applications (apps) compatible with our system.

## 2021-11-17 NOTE — ED PROVIDER NOTE - CLINICAL SUMMARY MEDICAL DECISION MAKING FREE TEXT BOX
pt c/o left arm numbness    ct spine with disc herniations , will follow yp with PMD for possible MRI

## 2021-11-17 NOTE — ED STATDOCS - PROGRESS NOTE DETAILS
66 year old female with PMH HTN presents with 3 days of LUE numbness and heaviness. Sx constant, no neck pain, headache, blurry vision, slurred speech, facial droop. Pt outside of code stroke window, sent to [facility]

## 2021-11-17 NOTE — ED ADULT NURSE REASSESSMENT NOTE - NS ED NURSE REASSESS COMMENT FT1
assumed care of pt, report from Heydi Godoy RN, pt off unit at CT will assess when returns to floor.

## 2021-11-18 ENCOUNTER — RX RENEWAL (OUTPATIENT)
Age: 66
End: 2021-11-18

## 2021-12-03 ENCOUNTER — NON-APPOINTMENT (OUTPATIENT)
Age: 66
End: 2021-12-03

## 2021-12-03 VITALS — SYSTOLIC BLOOD PRESSURE: 136 MMHG | DIASTOLIC BLOOD PRESSURE: 64 MMHG

## 2021-12-03 VITALS
HEART RATE: 100 BPM | DIASTOLIC BLOOD PRESSURE: 60 MMHG | SYSTOLIC BLOOD PRESSURE: 140 MMHG | OXYGEN SATURATION: 100 % | RESPIRATION RATE: 16 BRPM

## 2022-01-04 RX ORDER — AMLODIPINE BESYLATE 5 MG/1
5 TABLET ORAL
Qty: 90 | Refills: 0 | Status: DISCONTINUED | COMMUNITY
Start: 2021-11-18 | End: 2022-01-04

## 2022-01-31 ENCOUNTER — RX RENEWAL (OUTPATIENT)
Age: 67
End: 2022-01-31

## 2022-02-14 ENCOUNTER — RX RENEWAL (OUTPATIENT)
Age: 67
End: 2022-02-14

## 2022-03-22 ENCOUNTER — NON-APPOINTMENT (OUTPATIENT)
Age: 67
End: 2022-03-22

## 2022-03-22 ENCOUNTER — RX RENEWAL (OUTPATIENT)
Age: 67
End: 2022-03-22

## 2022-03-22 VITALS
DIASTOLIC BLOOD PRESSURE: 72 MMHG | OXYGEN SATURATION: 99 % | HEART RATE: 77 BPM | SYSTOLIC BLOOD PRESSURE: 146 MMHG | RESPIRATION RATE: 16 BRPM

## 2022-03-25 DIAGNOSIS — Z87.898 PERSONAL HISTORY OF OTHER SPECIFIED CONDITIONS: ICD-10-CM

## 2022-03-25 RX ORDER — LOSARTAN POTASSIUM 100 MG/1
100 TABLET, FILM COATED ORAL
Qty: 90 | Refills: 1 | Status: DISCONTINUED | COMMUNITY
Start: 2021-07-26 | End: 2022-03-25

## 2022-03-29 ENCOUNTER — APPOINTMENT (OUTPATIENT)
Dept: CARDIOLOGY | Facility: CLINIC | Age: 67
End: 2022-03-29

## 2022-03-30 ENCOUNTER — RESULT CHARGE (OUTPATIENT)
Age: 67
End: 2022-03-30

## 2022-03-31 ENCOUNTER — APPOINTMENT (OUTPATIENT)
Dept: CARDIOLOGY | Facility: CLINIC | Age: 67
End: 2022-03-31
Payer: MEDICAID

## 2022-03-31 VITALS
SYSTOLIC BLOOD PRESSURE: 136 MMHG | OXYGEN SATURATION: 100 % | HEART RATE: 94 BPM | DIASTOLIC BLOOD PRESSURE: 75 MMHG | BODY MASS INDEX: 27.23 KG/M2 | HEIGHT: 62 IN | TEMPERATURE: 99.1 F | WEIGHT: 148 LBS

## 2022-03-31 VITALS — SYSTOLIC BLOOD PRESSURE: 130 MMHG | DIASTOLIC BLOOD PRESSURE: 70 MMHG

## 2022-03-31 PROCEDURE — 99214 OFFICE O/P EST MOD 30 MIN: CPT

## 2022-03-31 PROCEDURE — 93000 ELECTROCARDIOGRAM COMPLETE: CPT

## 2022-03-31 RX ORDER — METOPROLOL TARTRATE 25 MG/1
25 TABLET, FILM COATED ORAL TWICE DAILY
Qty: 90 | Refills: 0 | Status: DISCONTINUED | COMMUNITY
End: 2022-03-31

## 2022-03-31 RX ORDER — LABETALOL HYDROCHLORIDE 200 MG/1
200 TABLET, FILM COATED ORAL
Qty: 180 | Refills: 3 | Status: DISCONTINUED | COMMUNITY
End: 2022-03-31

## 2022-03-31 RX ORDER — MULTIVIT-MIN/FOLIC/VIT K/LYCOP 400-300MCG
500 TABLET ORAL DAILY
Qty: 90 | Refills: 0 | Status: DISCONTINUED | COMMUNITY
End: 2022-03-31

## 2022-03-31 RX ORDER — CHLORHEXIDINE GLUCONATE 4 %
325 (65 FE) LIQUID (ML) TOPICAL 3 TIMES DAILY
Refills: 0 | Status: DISCONTINUED | COMMUNITY
End: 2022-03-31

## 2022-03-31 RX ORDER — FOLIC ACID 1 MG/1
1 TABLET ORAL
Qty: 90 | Refills: 1 | Status: DISCONTINUED | COMMUNITY
End: 2022-03-31

## 2022-04-12 ENCOUNTER — NON-APPOINTMENT (OUTPATIENT)
Age: 67
End: 2022-04-12

## 2022-05-10 RX ORDER — LOSARTAN POTASSIUM 100 MG/1
100 TABLET, FILM COATED ORAL DAILY
Qty: 1 | Refills: 3 | Status: DISCONTINUED | COMMUNITY
End: 2022-05-10

## 2022-05-13 ENCOUNTER — RX RENEWAL (OUTPATIENT)
Age: 67
End: 2022-05-13

## 2022-11-14 NOTE — ED ADULT TRIAGE NOTE - ARRIVAL FROM
Home Odomzo Counseling- I discussed with the patient the risks of Odomzo including but not limited to nausea, vomiting, diarrhea, constipation, weight loss, changes in the sense of taste, decreased appetite, muscle spasms, and hair loss.  The patient verbalized understanding of the proper use and possible adverse effects of Odomzo.  All of the patient's questions and concerns were addressed.

## 2023-01-05 ENCOUNTER — NON-APPOINTMENT (OUTPATIENT)
Age: 68
End: 2023-01-05

## 2023-01-05 ENCOUNTER — APPOINTMENT (OUTPATIENT)
Dept: CARDIOLOGY | Facility: CLINIC | Age: 68
End: 2023-01-05
Payer: MEDICARE

## 2023-01-05 VITALS — DIASTOLIC BLOOD PRESSURE: 70 MMHG | SYSTOLIC BLOOD PRESSURE: 138 MMHG

## 2023-01-05 VITALS
WEIGHT: 137 LBS | TEMPERATURE: 98 F | HEIGHT: 62 IN | OXYGEN SATURATION: 100 % | HEART RATE: 74 BPM | DIASTOLIC BLOOD PRESSURE: 70 MMHG | SYSTOLIC BLOOD PRESSURE: 148 MMHG | BODY MASS INDEX: 25.21 KG/M2

## 2023-01-05 DIAGNOSIS — Z79.899 OTHER LONG TERM (CURRENT) DRUG THERAPY: ICD-10-CM

## 2023-01-05 PROCEDURE — 99214 OFFICE O/P EST MOD 30 MIN: CPT | Mod: 25

## 2023-01-05 PROCEDURE — 93000 ELECTROCARDIOGRAM COMPLETE: CPT

## 2023-01-20 NOTE — ED PROVIDER NOTE - CHPI ED SYMPTOMS NEG
Outpatient Care Management  Plan of Care Follow Up Visit    Patient: Cyrus Batres Jr.  MRN: [id number]  Date of Service: 01/20/2023  Completed by: Eugenia Agudelo RN  Referral Date: 09/02/2022    No chief complaint on file.      Brief Summary: 01/20/23-Not available at this time.   Port was inserted on 01/11/23. He is starting chemo treatments on 01/23/23.   Next Steps: Follow up next week.         
no dizziness/no nausea/no pain/no vomiting/no weakness/no chills

## 2023-02-01 ENCOUNTER — NON-APPOINTMENT (OUTPATIENT)
Age: 68
End: 2023-02-01

## 2023-04-24 ENCOUNTER — RX RENEWAL (OUTPATIENT)
Age: 68
End: 2023-04-24

## 2023-05-30 ENCOUNTER — INPATIENT (INPATIENT)
Facility: HOSPITAL | Age: 68
LOS: 2 days | Discharge: HOME | DRG: 391 | End: 2023-06-02
Attending: COLON & RECTAL SURGERY | Admitting: COLON & RECTAL SURGERY
Payer: MEDICARE

## 2023-05-30 VITALS
OXYGEN SATURATION: 100 % | WEIGHT: 139.11 LBS | HEART RATE: 97 BPM | HEIGHT: 65 IN | DIASTOLIC BLOOD PRESSURE: 73 MMHG | RESPIRATION RATE: 20 BRPM | TEMPERATURE: 99 F | SYSTOLIC BLOOD PRESSURE: 150 MMHG

## 2023-05-30 DIAGNOSIS — Z98.51 TUBAL LIGATION STATUS: Chronic | ICD-10-CM

## 2023-05-30 LAB
ALBUMIN SERPL ELPH-MCNC: 4.3 G/DL — SIGNIFICANT CHANGE UP (ref 3.3–5.2)
ALP SERPL-CCNC: 96 U/L — SIGNIFICANT CHANGE UP (ref 40–120)
ALT FLD-CCNC: 11 U/L — SIGNIFICANT CHANGE UP
ANION GAP SERPL CALC-SCNC: 11 MMOL/L — SIGNIFICANT CHANGE UP (ref 5–17)
APPEARANCE UR: CLEAR — SIGNIFICANT CHANGE UP
AST SERPL-CCNC: 16 U/L — SIGNIFICANT CHANGE UP
BACTERIA # UR AUTO: ABNORMAL
BASE EXCESS BLDV CALC-SCNC: 3.4 MMOL/L — HIGH (ref -2–3)
BASOPHILS # BLD AUTO: 0.01 K/UL — SIGNIFICANT CHANGE UP (ref 0–0.2)
BASOPHILS NFR BLD AUTO: 0.1 % — SIGNIFICANT CHANGE UP (ref 0–2)
BILIRUB SERPL-MCNC: 0.3 MG/DL — LOW (ref 0.4–2)
BILIRUB UR-MCNC: NEGATIVE — SIGNIFICANT CHANGE UP
BLD GP AB SCN SERPL QL: SIGNIFICANT CHANGE UP
BUN SERPL-MCNC: 13 MG/DL — SIGNIFICANT CHANGE UP (ref 8–20)
CA-I SERPL-SCNC: 1.26 MMOL/L — SIGNIFICANT CHANGE UP (ref 1.15–1.33)
CALCIUM SERPL-MCNC: 9.8 MG/DL — SIGNIFICANT CHANGE UP (ref 8.4–10.5)
CHLORIDE BLDV-SCNC: 105 MMOL/L — SIGNIFICANT CHANGE UP (ref 96–108)
CHLORIDE SERPL-SCNC: 101 MMOL/L — SIGNIFICANT CHANGE UP (ref 96–108)
CO2 SERPL-SCNC: 27 MMOL/L — SIGNIFICANT CHANGE UP (ref 22–29)
COLOR SPEC: YELLOW — SIGNIFICANT CHANGE UP
CREAT SERPL-MCNC: 0.86 MG/DL — SIGNIFICANT CHANGE UP (ref 0.5–1.3)
DIFF PNL FLD: ABNORMAL
EGFR: 75 ML/MIN/1.73M2 — SIGNIFICANT CHANGE UP
EOSINOPHIL # BLD AUTO: 0.04 K/UL — SIGNIFICANT CHANGE UP (ref 0–0.5)
EOSINOPHIL NFR BLD AUTO: 0.4 % — SIGNIFICANT CHANGE UP (ref 0–6)
EPI CELLS # UR: SIGNIFICANT CHANGE UP
GAS PNL BLDV: 137 MMOL/L — SIGNIFICANT CHANGE UP (ref 136–145)
GAS PNL BLDV: SIGNIFICANT CHANGE UP
GLUCOSE BLDV-MCNC: 86 MG/DL — SIGNIFICANT CHANGE UP (ref 70–99)
GLUCOSE SERPL-MCNC: 92 MG/DL — SIGNIFICANT CHANGE UP (ref 70–99)
GLUCOSE UR QL: NEGATIVE MG/DL — SIGNIFICANT CHANGE UP
HCO3 BLDV-SCNC: 29 MMOL/L — SIGNIFICANT CHANGE UP (ref 22–29)
HCT VFR BLD CALC: 34.7 % — SIGNIFICANT CHANGE UP (ref 34.5–45)
HCT VFR BLDA CALC: 36 % — SIGNIFICANT CHANGE UP
HGB BLD CALC-MCNC: 11.9 G/DL — SIGNIFICANT CHANGE UP (ref 11.7–16.1)
HGB BLD-MCNC: 10.7 G/DL — LOW (ref 11.5–15.5)
IMM GRANULOCYTES NFR BLD AUTO: 0.3 % — SIGNIFICANT CHANGE UP (ref 0–0.9)
KETONES UR-MCNC: NEGATIVE — SIGNIFICANT CHANGE UP
LACTATE BLDV-MCNC: 1 MMOL/L — SIGNIFICANT CHANGE UP (ref 0.5–2)
LACTATE BLDV-MCNC: 1.1 MMOL/L — SIGNIFICANT CHANGE UP (ref 0.5–2)
LEUKOCYTE ESTERASE UR-ACNC: NEGATIVE — SIGNIFICANT CHANGE UP
LYMPHOCYTES # BLD AUTO: 1.31 K/UL — SIGNIFICANT CHANGE UP (ref 1–3.3)
LYMPHOCYTES # BLD AUTO: 13 % — SIGNIFICANT CHANGE UP (ref 13–44)
MCHC RBC-ENTMCNC: 27.8 PG — SIGNIFICANT CHANGE UP (ref 27–34)
MCHC RBC-ENTMCNC: 30.8 GM/DL — LOW (ref 32–36)
MCV RBC AUTO: 90.1 FL — SIGNIFICANT CHANGE UP (ref 80–100)
MONOCYTES # BLD AUTO: 0.54 K/UL — SIGNIFICANT CHANGE UP (ref 0–0.9)
MONOCYTES NFR BLD AUTO: 5.4 % — SIGNIFICANT CHANGE UP (ref 2–14)
NEUTROPHILS # BLD AUTO: 8.14 K/UL — HIGH (ref 1.8–7.4)
NEUTROPHILS NFR BLD AUTO: 80.8 % — HIGH (ref 43–77)
NITRITE UR-MCNC: NEGATIVE — SIGNIFICANT CHANGE UP
OB PNL STL: NEGATIVE — SIGNIFICANT CHANGE UP
PCO2 BLDV: 51 MMHG — HIGH (ref 39–42)
PH BLDV: 7.36 — SIGNIFICANT CHANGE UP (ref 7.32–7.43)
PH UR: 6 — SIGNIFICANT CHANGE UP (ref 5–8)
PLATELET # BLD AUTO: 459 K/UL — HIGH (ref 150–400)
PO2 BLDV: 51 MMHG — HIGH (ref 25–45)
POTASSIUM BLDV-SCNC: 4 MMOL/L — SIGNIFICANT CHANGE UP (ref 3.5–5.1)
POTASSIUM SERPL-MCNC: 3.9 MMOL/L — SIGNIFICANT CHANGE UP (ref 3.5–5.3)
POTASSIUM SERPL-SCNC: 3.9 MMOL/L — SIGNIFICANT CHANGE UP (ref 3.5–5.3)
PROT SERPL-MCNC: 7.4 G/DL — SIGNIFICANT CHANGE UP (ref 6.6–8.7)
PROT UR-MCNC: NEGATIVE — SIGNIFICANT CHANGE UP
RBC # BLD: 3.85 M/UL — SIGNIFICANT CHANGE UP (ref 3.8–5.2)
RBC # FLD: 12.6 % — SIGNIFICANT CHANGE UP (ref 10.3–14.5)
RBC CASTS # UR COMP ASSIST: ABNORMAL /HPF (ref 0–4)
SAO2 % BLDV: 88 % — SIGNIFICANT CHANGE UP
SODIUM SERPL-SCNC: 139 MMOL/L — SIGNIFICANT CHANGE UP (ref 135–145)
SP GR SPEC: 1 — LOW (ref 1.01–1.02)
TROPONIN T SERPL-MCNC: <0.01 NG/ML — SIGNIFICANT CHANGE UP (ref 0–0.06)
UROBILINOGEN FLD QL: NEGATIVE MG/DL — SIGNIFICANT CHANGE UP
WBC # BLD: 10.07 K/UL — SIGNIFICANT CHANGE UP (ref 3.8–10.5)
WBC # FLD AUTO: 10.07 K/UL — SIGNIFICANT CHANGE UP (ref 3.8–10.5)
WBC UR QL: SIGNIFICANT CHANGE UP /HPF (ref 0–5)

## 2023-05-30 PROCEDURE — 74178 CT ABD&PLV WO CNTR FLWD CNTR: CPT | Mod: 26,MA

## 2023-05-30 PROCEDURE — 99285 EMERGENCY DEPT VISIT HI MDM: CPT

## 2023-05-30 PROCEDURE — 71045 X-RAY EXAM CHEST 1 VIEW: CPT | Mod: 26

## 2023-05-30 RX ORDER — PIPERACILLIN AND TAZOBACTAM 4; .5 G/20ML; G/20ML
3.38 INJECTION, POWDER, LYOPHILIZED, FOR SOLUTION INTRAVENOUS ONCE
Refills: 0 | Status: COMPLETED | OUTPATIENT
Start: 2023-05-30 | End: 2023-05-30

## 2023-05-30 RX ORDER — PIPERACILLIN AND TAZOBACTAM 4; .5 G/20ML; G/20ML
3.38 INJECTION, POWDER, LYOPHILIZED, FOR SOLUTION INTRAVENOUS ONCE
Refills: 0 | Status: DISCONTINUED | OUTPATIENT
Start: 2023-05-30 | End: 2023-05-30

## 2023-05-30 RX ORDER — SODIUM CHLORIDE 9 MG/ML
1000 INJECTION INTRAMUSCULAR; INTRAVENOUS; SUBCUTANEOUS ONCE
Refills: 0 | Status: COMPLETED | OUTPATIENT
Start: 2023-05-30 | End: 2023-05-30

## 2023-05-30 RX ADMIN — PIPERACILLIN AND TAZOBACTAM 200 GRAM(S): 4; .5 INJECTION, POWDER, LYOPHILIZED, FOR SOLUTION INTRAVENOUS at 19:47

## 2023-05-30 RX ADMIN — SODIUM CHLORIDE 1000 MILLILITER(S): 9 INJECTION INTRAMUSCULAR; INTRAVENOUS; SUBCUTANEOUS at 16:03

## 2023-05-30 RX ADMIN — SODIUM CHLORIDE 1000 MILLILITER(S): 9 INJECTION INTRAMUSCULAR; INTRAVENOUS; SUBCUTANEOUS at 18:36

## 2023-05-30 NOTE — ED PROVIDER NOTE - CPE EDP EYES NORM
SUBJECTIVE:   Kary Perry is a 50 year old female presenting with a chief complaint of   Chief Complaint   Patient presents with     Urgent Care     Shingles     c/o shingles for 1 day   .    Onset of rash was 1 day(s) ago.     Current and Associated symptoms: itching then burning  Location of the rash: low back.  Her spouse told her the rash was blistering  Previous history of a similar rash? No  Recent exposure history: none known  Denies exposure to: none known  Associated symptoms include: headache .  Treatment measures tried include: otc hydrocortisone cream  Took 1 dose acyclovir that was left over        Review of Systems   Constitutional: Negative for fever.         Past Medical History:   Diagnosis Date     Diffuse cystic mastopathy     1991 had mammogram - left breast more dense     Family history of blood disorder     related to a transfusion in her paternal grandmother     FH: non-Hodgkin's lymphoma     Mother 2009     Herpes simplex without mention of complication     herpes labialis     Infectious mononucleosis 1987     Other specified temporomandibular joint disorders      Undiagnosed cardiac murmurs     innocent murmur     Current Outpatient Prescriptions   Medication Sig Dispense Refill     acyclovir (ZOVIRAX) 800 MG tablet Take 1 tablet (800 mg) by mouth 3 times daily for 2 days prn 24 tablet 2     scopolamine (TRANSDERM) 72 hr patch Apply 1 patch to hairless area behind one ear at least 4 hours before travel.  Remove old patch and change every 3 days (72 hours). 5 patch 0     acyclovir (ZOVIRAX) 5 % ointment Apply topically 6 times daily 15 g 3     acyclovir (ZOVIRAX) 800 MG tablet Take 1 tablet (800 mg) by mouth 3 times daily 18 tablet 11     Omega-3 Fatty Acids (OMEGA-3 FISH OIL PO) Take 1,200 mg by mouth daily        Cholecalciferol (VITAMIN D) 2000 UNITS tablet Take 4,000 Units by mouth daily        Ibuprofen 200 MG capsule Take 200-600 mg by mouth every 4 hours as needed for  "fever. 100 capsule 3     Calcium 600 MG tablet Take 1 tablet by mouth daily.       VITAMIN B COMPLEX PO TABS 2,000 mg daily       MULTIVITAMIN TABS   OR 2 tabs daily       Social History   Substance Use Topics     Smoking status: Never Smoker     Smokeless tobacco: Never Used     Alcohol use Yes      Comment: 3 drinks per week       OBJECTIVE  /71  Pulse 75  Temp 97.9  F (36.6  C) (Tympanic)  Ht 5' 6\" (1.676 m)  Wt 165 lb (74.8 kg)  SpO2 100%  BMI 26.63 kg/m2    Physical Exam   Constitutional: She is well-developed, well-nourished, and in no distress.   Skin: Rash noted.       Just right of the gluteal fold  Red base - blistering lesions all on one patch of red raised red lesions oval 2 x 1 cm  Labs:  No results found for this or any previous visit (from the past 24 hour(s)).        ASSESSMENT:      ICD-10-CM    1. Herpes simplex virus infection B00.9 acyclovir (ZOVIRAX) 800 MG tablet     HSV 1 and 2 DNA by PCR     Varicella Zoster DNA PCR CSF or Skin Swab     acyclovir (ZOVIRAX) 5 % ointment   2. Cold sore B00.1 acyclovir (ZOVIRAX) 800 MG tablet     acyclovir (ZOVIRAX) 5 % ointment   3. Motion sickness, initial encounter T75.3XXA scopolamine (TRANSDERM) 72 hr patch        Medical Decision Making:  Zoster vs hsv    PLAN:  Patient was interested in differentiating whether the viral viral outbreak was from zoster or HSV simplex so fluid from unroofed blisters were tested for both    Patient is to start oral acyclovir and topical ointment.  She also requested a refill of scopolamine patch for sailing trip      Discussed wound care for rash  Followup:  Call or return to clinic if symptoms worsen or fail to improve as anticipated.      Patient Instructions               " normal...

## 2023-05-30 NOTE — ED ADULT NURSE REASSESSMENT NOTE - NS ED NURSE REASSESS COMMENT FT1
Received pt from [facility] pt resting quietly in stretcher NAD noted at this time. no new orders at this time pt safety maintained.

## 2023-05-30 NOTE — ED ADULT NURSE NOTE - NSFALLUNIVINTERV_ED_ALL_ED
Bed/Stretcher in lowest position, wheels locked, appropriate side rails in place/Call bell, personal items and telephone in reach/Instruct patient to call for assistance before getting out of bed/chair/stretcher/Non-slip footwear applied when patient is off stretcher/[place] to call system/Physically safe environment - no spills, clutter or unnecessary equipment/Purposeful proactive rounding/Room/bathroom lighting operational, light cord in reach

## 2023-05-30 NOTE — ED ADULT NURSE REASSESSMENT NOTE - NS ED NURSE REASSESS COMMENT FT1
pt resting quietly in stretcher awaiting consults. NAD noted at this time. no new orders at this time pt safety maintained.

## 2023-05-30 NOTE — ED PROVIDER NOTE - PROGRESS NOTE DETAILS
Abhijeet: Pt received during sign out, CT Abd/Pelvis shows Acute Diverticulitis w/possible abscess. General Surgery consulted at 19:00, discussed with surgery resident at midnight who recommended Transvaginal U/S to delineate whether abscess was pelvic/uterine in nature. U/S results read as possible abscess vs necrotic fibroid. GYN consulted - pelvic exam unimpressive and do not believe abscess is pelvic in etiology. Surgery reconsulted, awaiting dispo. Abhijeet: Pt received during sign out, CT Abd/Pelvis shows Acute Diverticulitis w/possible abscess. Colorectal consulted. Recommended transvaginal U/S to delineate whether abscess was pelvic/uterine in nature. U/S results read as possible abscess vs necrotic fibroid. GYN consulted - pelvic exam unimpressive and do not believe abscess is pelvic in etiology. Colorectal reconsulted, will take the pt under their service. Pelvic MRI w/contrast ordered.

## 2023-05-30 NOTE — ED PROVIDER NOTE - OBJECTIVE STATEMENT
The patient is a 67 year old female presents with rectal bleeding for one day.  No abd pain. Not on blood thinner. No weight loss  Never had the colonoscopy  No lightheadedness

## 2023-05-30 NOTE — ED PROVIDER NOTE - CLINICAL SUMMARY MEDICAL DECISION MAKING FREE TEXT BOX
68 y/o female w/LLQ abdominal pain on exam and episodes of rectal bleeding. CT Abd/Pelvis shows sigmoid diverticulitis with a collection inseparable from the sigmoid and uterine borders suggesting a diverticular abscess. Zosyn given in ER. Colorectal surgery consulted.

## 2023-05-30 NOTE — ED PROVIDER NOTE - ATTENDING CONTRIBUTION TO CARE
The patient seen and examined    Rectal Bleeding    I, Maverick Mcmahan, performed the initial face to face bedside interview with this patient regarding history of present illness, review of symptoms and relevant past medical, social and family history.  I completed an independent physical examination.  I was the initial provider who evaluated this patient. I have signed out the follow up of any pending tests (i.e. labs, radiological studies) to the resident.  I have communicated the patient’s plan of care and disposition with the resident.

## 2023-05-30 NOTE — ED ADULT NURSE NOTE - OBJECTIVE STATEMENT
patient noticed red blood earlier today when she wiped herself as claimed. offers no other complaints

## 2023-05-31 DIAGNOSIS — K57.20 DIVERTICULITIS OF LARGE INTESTINE WITH PERFORATION AND ABSCESS WITHOUT BLEEDING: ICD-10-CM

## 2023-05-31 DIAGNOSIS — Z90.49 ACQUIRED ABSENCE OF OTHER SPECIFIED PARTS OF DIGESTIVE TRACT: Chronic | ICD-10-CM

## 2023-05-31 LAB
ANION GAP SERPL CALC-SCNC: 12 MMOL/L — SIGNIFICANT CHANGE UP (ref 5–17)
BASOPHILS # BLD AUTO: 0.03 K/UL — SIGNIFICANT CHANGE UP (ref 0–0.2)
BASOPHILS NFR BLD AUTO: 0.4 % — SIGNIFICANT CHANGE UP (ref 0–2)
BUN SERPL-MCNC: 8.7 MG/DL — SIGNIFICANT CHANGE UP (ref 8–20)
CALCIUM SERPL-MCNC: 9.5 MG/DL — SIGNIFICANT CHANGE UP (ref 8.4–10.5)
CHLORIDE SERPL-SCNC: 100 MMOL/L — SIGNIFICANT CHANGE UP (ref 96–108)
CO2 SERPL-SCNC: 25 MMOL/L — SIGNIFICANT CHANGE UP (ref 22–29)
CREAT SERPL-MCNC: 0.75 MG/DL — SIGNIFICANT CHANGE UP (ref 0.5–1.3)
EGFR: 88 ML/MIN/1.73M2 — SIGNIFICANT CHANGE UP
EOSINOPHIL # BLD AUTO: 0.07 K/UL — SIGNIFICANT CHANGE UP (ref 0–0.5)
EOSINOPHIL NFR BLD AUTO: 0.9 % — SIGNIFICANT CHANGE UP (ref 0–6)
GLUCOSE SERPL-MCNC: 94 MG/DL — SIGNIFICANT CHANGE UP (ref 70–99)
HCT VFR BLD CALC: 31.4 % — LOW (ref 34.5–45)
HGB BLD-MCNC: 9.9 G/DL — LOW (ref 11.5–15.5)
IMM GRANULOCYTES NFR BLD AUTO: 0.4 % — SIGNIFICANT CHANGE UP (ref 0–0.9)
LYMPHOCYTES # BLD AUTO: 1.4 K/UL — SIGNIFICANT CHANGE UP (ref 1–3.3)
LYMPHOCYTES # BLD AUTO: 18.8 % — SIGNIFICANT CHANGE UP (ref 13–44)
MAGNESIUM SERPL-MCNC: 1.7 MG/DL — SIGNIFICANT CHANGE UP (ref 1.6–2.6)
MCHC RBC-ENTMCNC: 28 PG — SIGNIFICANT CHANGE UP (ref 27–34)
MCHC RBC-ENTMCNC: 31.5 GM/DL — LOW (ref 32–36)
MCV RBC AUTO: 89 FL — SIGNIFICANT CHANGE UP (ref 80–100)
MONOCYTES # BLD AUTO: 0.49 K/UL — SIGNIFICANT CHANGE UP (ref 0–0.9)
MONOCYTES NFR BLD AUTO: 6.6 % — SIGNIFICANT CHANGE UP (ref 2–14)
NEUTROPHILS # BLD AUTO: 5.41 K/UL — SIGNIFICANT CHANGE UP (ref 1.8–7.4)
NEUTROPHILS NFR BLD AUTO: 72.9 % — SIGNIFICANT CHANGE UP (ref 43–77)
PHOSPHATE SERPL-MCNC: 3 MG/DL — SIGNIFICANT CHANGE UP (ref 2.4–4.7)
PLATELET # BLD AUTO: 415 K/UL — HIGH (ref 150–400)
POTASSIUM SERPL-MCNC: 3.6 MMOL/L — SIGNIFICANT CHANGE UP (ref 3.5–5.3)
POTASSIUM SERPL-SCNC: 3.6 MMOL/L — SIGNIFICANT CHANGE UP (ref 3.5–5.3)
RBC # BLD: 3.53 M/UL — LOW (ref 3.8–5.2)
RBC # FLD: 12.7 % — SIGNIFICANT CHANGE UP (ref 10.3–14.5)
SODIUM SERPL-SCNC: 137 MMOL/L — SIGNIFICANT CHANGE UP (ref 135–145)
WBC # BLD: 7.43 K/UL — SIGNIFICANT CHANGE UP (ref 3.8–10.5)
WBC # FLD AUTO: 7.43 K/UL — SIGNIFICANT CHANGE UP (ref 3.8–10.5)

## 2023-05-31 PROCEDURE — 93010 ELECTROCARDIOGRAM REPORT: CPT

## 2023-05-31 PROCEDURE — 76856 US EXAM PELVIC COMPLETE: CPT | Mod: 26

## 2023-05-31 PROCEDURE — 76830 TRANSVAGINAL US NON-OB: CPT | Mod: 26

## 2023-05-31 PROCEDURE — 99222 1ST HOSP IP/OBS MODERATE 55: CPT

## 2023-05-31 RX ORDER — PIPERACILLIN AND TAZOBACTAM 4; .5 G/20ML; G/20ML
3.38 INJECTION, POWDER, LYOPHILIZED, FOR SOLUTION INTRAVENOUS ONCE
Refills: 0 | Status: COMPLETED | OUTPATIENT
Start: 2023-05-31 | End: 2023-05-31

## 2023-05-31 RX ORDER — LOSARTAN POTASSIUM 100 MG/1
100 TABLET, FILM COATED ORAL DAILY
Refills: 0 | Status: DISCONTINUED | OUTPATIENT
Start: 2023-05-31 | End: 2023-06-02

## 2023-05-31 RX ORDER — PIPERACILLIN AND TAZOBACTAM 4; .5 G/20ML; G/20ML
3.38 INJECTION, POWDER, LYOPHILIZED, FOR SOLUTION INTRAVENOUS EVERY 8 HOURS
Refills: 0 | Status: DISCONTINUED | OUTPATIENT
Start: 2023-06-01 | End: 2023-06-02

## 2023-05-31 RX ORDER — PIPERACILLIN AND TAZOBACTAM 4; .5 G/20ML; G/20ML
3.38 INJECTION, POWDER, LYOPHILIZED, FOR SOLUTION INTRAVENOUS ONCE
Refills: 0 | Status: COMPLETED | OUTPATIENT
Start: 2023-06-01 | End: 2023-05-31

## 2023-05-31 RX ORDER — ATORVASTATIN CALCIUM 80 MG/1
1 TABLET, FILM COATED ORAL
Refills: 0 | DISCHARGE

## 2023-05-31 RX ORDER — LOSARTAN POTASSIUM 100 MG/1
1 TABLET, FILM COATED ORAL
Refills: 0 | DISCHARGE

## 2023-05-31 RX ORDER — ATORVASTATIN CALCIUM 80 MG/1
20 TABLET, FILM COATED ORAL AT BEDTIME
Refills: 0 | Status: DISCONTINUED | OUTPATIENT
Start: 2023-05-31 | End: 2023-06-02

## 2023-05-31 RX ORDER — ASCORBIC ACID 60 MG
500 TABLET,CHEWABLE ORAL DAILY
Refills: 0 | Status: DISCONTINUED | OUTPATIENT
Start: 2023-05-31 | End: 2023-06-02

## 2023-05-31 RX ORDER — ACETAMINOPHEN 500 MG
650 TABLET ORAL EVERY 6 HOURS
Refills: 0 | Status: DISCONTINUED | OUTPATIENT
Start: 2023-05-31 | End: 2023-06-02

## 2023-05-31 RX ORDER — AMLODIPINE BESYLATE 2.5 MG/1
10 TABLET ORAL DAILY
Refills: 0 | Status: DISCONTINUED | OUTPATIENT
Start: 2023-05-31 | End: 2023-06-02

## 2023-05-31 RX ORDER — ENOXAPARIN SODIUM 100 MG/ML
40 INJECTION SUBCUTANEOUS EVERY 24 HOURS
Refills: 0 | Status: DISCONTINUED | OUTPATIENT
Start: 2023-05-31 | End: 2023-06-01

## 2023-05-31 RX ORDER — SODIUM CHLORIDE 9 MG/ML
1000 INJECTION, SOLUTION INTRAVENOUS
Refills: 0 | Status: DISCONTINUED | OUTPATIENT
Start: 2023-05-31 | End: 2023-06-02

## 2023-05-31 RX ORDER — FOLIC ACID 0.8 MG
1 TABLET ORAL DAILY
Refills: 0 | Status: DISCONTINUED | OUTPATIENT
Start: 2023-05-31 | End: 2023-06-02

## 2023-05-31 RX ORDER — AMLODIPINE BESYLATE 2.5 MG/1
1 TABLET ORAL
Refills: 0 | DISCHARGE

## 2023-05-31 RX ADMIN — ATORVASTATIN CALCIUM 20 MILLIGRAM(S): 80 TABLET, FILM COATED ORAL at 21:10

## 2023-05-31 RX ADMIN — ENOXAPARIN SODIUM 40 MILLIGRAM(S): 100 INJECTION SUBCUTANEOUS at 11:30

## 2023-05-31 RX ADMIN — PIPERACILLIN AND TAZOBACTAM 25 GRAM(S): 4; .5 INJECTION, POWDER, LYOPHILIZED, FOR SOLUTION INTRAVENOUS at 11:29

## 2023-05-31 RX ADMIN — AMLODIPINE BESYLATE 10 MILLIGRAM(S): 2.5 TABLET ORAL at 16:02

## 2023-05-31 RX ADMIN — PIPERACILLIN AND TAZOBACTAM 25 GRAM(S): 4; .5 INJECTION, POWDER, LYOPHILIZED, FOR SOLUTION INTRAVENOUS at 18:15

## 2023-05-31 RX ADMIN — LOSARTAN POTASSIUM 100 MILLIGRAM(S): 100 TABLET, FILM COATED ORAL at 16:02

## 2023-05-31 RX ADMIN — PIPERACILLIN AND TAZOBACTAM 200 GRAM(S): 4; .5 INJECTION, POWDER, LYOPHILIZED, FOR SOLUTION INTRAVENOUS at 08:00

## 2023-05-31 RX ADMIN — SODIUM CHLORIDE 100 MILLILITER(S): 9 INJECTION, SOLUTION INTRAVENOUS at 11:29

## 2023-05-31 RX ADMIN — PIPERACILLIN AND TAZOBACTAM 25 GRAM(S): 4; .5 INJECTION, POWDER, LYOPHILIZED, FOR SOLUTION INTRAVENOUS at 23:44

## 2023-05-31 NOTE — CONSULT NOTE ADULT - SUBJECTIVE AND OBJECTIVE BOX
MAMADOU CHISHOLM is a 67y , LMP 20 years ago, who presented to the ED for new onset rectal bleeding starting last night. She reports associated intermittent sharp pelvic pain that shoots down her legs. She has not taken anything for the pain. Her in the ED, CT scan was concerning 'a collection inseparable from the mid sigmoid colon and inseparable from the uterine border' with a follow up ultrasound that is concerning for possible necrotic fibroids vs. abscess. Patient denies any fevers, chills, nausea, vomiting, shortness of breath, palpitations or chest pain. GYN consulted for evaluation of possible pelvic abscess.=.      OB history:  x3, SABx1  GYN history: LMP 20 years ago, last pap smear unknown; denies hx abn pap smear; denies hx of STDs; known history of fibroids  Past medical history: HTN  Past surgical history: BTL, ileocecectomy and anastaomsis  Medications: antihypertensives  Allergies: nkda    Physical Exam  T(C): 37.2 (23 @ 03:16), Max: 37.2 (23 @ 03:16)  HR: 75 (23 @ 03:16) (68 - 97)  BP: 149/83 (23 @ 03:16) (138/59 - 150/73)  RR: 19 (23 @ 03:16) (19 - 20)  SpO2: 99% (23 @ 03:16) (98% - 100%)    General: alert and oriented x3, no acute distress    Abdominal: Non- tender to palpation in all 4 quadrants. No guarding, no rebound tenderness.  Pelvic: normal external genitalia, no active vaginal bleeding, normal physiologic discharge, cervix within normal limits. No adnexal massess appreciated on exam, no CVA tenderness      Labs:                        10.7   10.07 )-----------( 459      ( 30 May 2023 15:45 )             34.7         139  |  101  |  13.0  ----------------------------<  92  3.9   |  27.0  |  0.86    Ca    9.8      30 May 2023 15:45    TPro  7.4  /  Alb  4.3  /  TBili  0.3<L>  /  DBili  x   /  AST  16  /  ALT  11  /  AlkPhos  96  05-30                Imaging:   CT:  REPRODUCTIVE ORGANS: Uterus demonstrates calcified fibroid. Right adnexa   is unremarkable. Left adnexal low-attenuation well-circumscribed process   inseparable from the uterus and inseparable from the sigmoid colon. This   measures 3.5 x 2.1 cm    BOWEL: No bowel obstruction. Suture line in theright proximal ascending   colon. There is sigmoid colon diverticulosis with wall thickening of the   distal descending and proximal sigmoid colon. Possible abscess as   described above.  PERITONEUM: Small amount of pelvic ascites.  VESSELS: Atherosclerotic changes.  RETROPERITONEUM/LYMPH NODES: No lymphadenopathy.  ABDOMINAL WALL: Within normal limits.  BONES: Degenerative changes. Mild grade 1 anterolisthesis of L4 on L5    IMPRESSION:  No CT evidence of active GI bleeding. There is diverticulosis with   sigmoid colon diverticulitis. There is a collection inseparable from the   mid sigmoid colon and inseparable from the uterine border which on the   coronal images seems connected to the sigmoid colon. This may represent a   diverticular abscess. Uterine or adnexal abnormality is not completely   excluded.    TVUS:  FINDINGS:  Uterus: 10.5 x 5.5 x 6.5 cm. Calcified fibroid is noted anteriorly   measuring up to 3.5 cm. Along the peripheral aspect of the posterior   uterus there is a heterogeneous ill-defined cystic region measuring 3.4 x   3.5 x 2.0 x 2.8 cm. There is surrounding hyperemia. The endometrial echo  complex cannot be visualized.    The bilateral ovaries are not visualized.    Fluid: Small free fluid seen within the cul-de-sac.    IMPRESSION:  Fibroid uterus.    Heterogeneous cystic structure seen along the posterior uterus with   surrounding hyperemia. This may represent an abscess or necrotic fibroid.   Correlate clinically. Contrast-enhanced pelvic MRI may be further   delineation.     MAMADOU CHISHOLM is a 67y , LMP 20 years ago, who presented to the ED for new onset rectal bleeding starting last night. She reports associated intermittent sharp pelvic pain that shoots down her legs. She has not taken anything for the pain. Her in the ED, CT scan was concerning 'a collection inseparable from the mid sigmoid colon and inseparable from the uterine border' with a follow up ultrasound that is concerning for possible necrotic fibroids vs. abscess. Patient denies any fevers, chills, nausea, vomiting, shortness of breath, palpitations or chest pain. GYN consulted for evaluation of possible pelvic abscess.=.      OB history:  x3, SABx1  GYN history: LMP 20 years ago, last pap smear unknown; denies hx abn pap smear; denies hx of STDs; known history of fibroids; hx of TOA - surgical report notes frozen abdomen, required surgical procedure noted below  Past medical history: HTN  Past surgical history: BTL, TOA s/p ileocecectomy and anastaomsis  Medications: antihypertensives  Allergies: nkda    Physical Exam  T(C): 37.2 (23 @ 03:16), Max: 37.2 (23 @ 03:16)  HR: 75 (23 @ 03:16) (68 - 97)  BP: 149/83 (23 @ 03:16) (138/59 - 150/73)  RR: 19 (23 @ 03:16) (19 - 20)  SpO2: 99% (23 @ 03:16) (98% - 100%)    General: alert and oriented x3, no acute distress    Abdominal: Non- tender to palpation in all 4 quadrants. No guarding, no rebound tenderness.  Pelvic: normal external genitalia, no active vaginal bleeding, normal physiologic discharge, cervix within normal limits. No adnexal massess appreciated on exam, no CVA tenderness      Labs:                        10.7   10.07 )-----------( 459      ( 30 May 2023 15:45 )             34.7         139  |  101  |  13.0  ----------------------------<  92  3.9   |  27.0  |  0.86    Ca    9.8      30 May 2023 15:45    TPro  7.4  /  Alb  4.3  /  TBili  0.3<L>  /  DBili  x   /  AST  16  /  ALT  11  /  AlkPhos  96  05-30                Imaging:   CT:  REPRODUCTIVE ORGANS: Uterus demonstrates calcified fibroid. Right adnexa   is unremarkable. Left adnexal low-attenuation well-circumscribed process   inseparable from the uterus and inseparable from the sigmoid colon. This   measures 3.5 x 2.1 cm    BOWEL: No bowel obstruction. Suture line in theright proximal ascending   colon. There is sigmoid colon diverticulosis with wall thickening of the   distal descending and proximal sigmoid colon. Possible abscess as   described above.  PERITONEUM: Small amount of pelvic ascites.  VESSELS: Atherosclerotic changes.  RETROPERITONEUM/LYMPH NODES: No lymphadenopathy.  ABDOMINAL WALL: Within normal limits.  BONES: Degenerative changes. Mild grade 1 anterolisthesis of L4 on L5    IMPRESSION:  No CT evidence of active GI bleeding. There is diverticulosis with   sigmoid colon diverticulitis. There is a collection inseparable from the   mid sigmoid colon and inseparable from the uterine border which on the   coronal images seems connected to the sigmoid colon. This may represent a   diverticular abscess. Uterine or adnexal abnormality is not completely   excluded.    TVUS:  FINDINGS:  Uterus: 10.5 x 5.5 x 6.5 cm. Calcified fibroid is noted anteriorly   measuring up to 3.5 cm. Along the peripheral aspect of the posterior   uterus there is a heterogeneous ill-defined cystic region measuring 3.4 x   3.5 x 2.0 x 2.8 cm. There is surrounding hyperemia. The endometrial echo  complex cannot be visualized.    The bilateral ovaries are not visualized.    Fluid: Small free fluid seen within the cul-de-sac.    IMPRESSION:  Fibroid uterus.    Heterogeneous cystic structure seen along the posterior uterus with   surrounding hyperemia. This may represent an abscess or necrotic fibroid.   Correlate clinically. Contrast-enhanced pelvic MRI may be further   delineation.     MAMADOU CHISHOLM is a 67y , LMP 20 years ago, who presented to the ED for new onset rectal bleeding starting last night. She reports associated intermittent sharp pelvic pain that shoots down her legs. She has not taken anything for the pain. Her in the ED, CT scan was concerning 'a collection inseparable from the mid sigmoid colon and inseparable from the uterine border' with a follow up ultrasound that is concerning for possible necrotic fibroids vs. abscess. Patient denies any fevers, chills, nausea, vomiting, shortness of breath, palpitations or chest pain. GYN consulted for evaluation of possible pelvic abscess.=.      OB history:  x3, SABx1  GYN history: LMP 20 years ago, last pap smear unknown; denies hx abn pap smear; denies hx of STDs; known history of fibroids; hx of TOA - surgical report notes frozen abdomen, required surgical procedure noted below  Past medical history: HTN  Past surgical history: BTL, TOA s/p ileocecectomy and anastomosis  Medications: antihypertensives  Allergies: nkda    Physical Exam  T(C): 37.2 (23 @ 03:16), Max: 37.2 (23 @ 03:16)  HR: 75 (23 @ 03:16) (68 - 97)  BP: 149/83 (23 @ 03:16) (138/59 - 150/73)  RR: 19 (23 @ 03:16) (19 - 20)  SpO2: 99% (23 @ 03:16) (98% - 100%)    General: alert and oriented x3, no acute distress    Abdominal: Non- tender to palpation in all 4 quadrants. No guarding, no rebound tenderness.  Pelvic: normal external genitalia, no active vaginal bleeding, normal physiologic discharge, cervix within normal limits. No adnexal massess appreciated on exam, no CVA tenderness      Labs:                        10.7   10.07 )-----------( 459      ( 30 May 2023 15:45 )             34.7         139  |  101  |  13.0  ----------------------------<  92  3.9   |  27.0  |  0.86    Ca    9.8      30 May 2023 15:45    TPro  7.4  /  Alb  4.3  /  TBili  0.3<L>  /  DBili  x   /  AST  16  /  ALT  11  /  AlkPhos  96                  Imaging:   CT:  REPRODUCTIVE ORGANS: Uterus demonstrates calcified fibroid. Right adnexa   is unremarkable. Left adnexal low-attenuation well-circumscribed process   inseparable from the uterus and inseparable from the sigmoid colon. This   measures 3.5 x 2.1 cm    BOWEL: No bowel obstruction. Suture line in theright proximal ascending   colon. There is sigmoid colon diverticulosis with wall thickening of the   distal descending and proximal sigmoid colon. Possible abscess as   described above.  PERITONEUM: Small amount of pelvic ascites.  VESSELS: Atherosclerotic changes.  RETROPERITONEUM/LYMPH NODES: No lymphadenopathy.  ABDOMINAL WALL: Within normal limits.  BONES: Degenerative changes. Mild grade 1 anterolisthesis of L4 on L5    IMPRESSION:  No CT evidence of active GI bleeding. There is diverticulosis with   sigmoid colon diverticulitis. There is a collection inseparable from the   mid sigmoid colon and inseparable from the uterine border which on the   coronal images seems connected to the sigmoid colon. This may represent a   diverticular abscess. Uterine or adnexal abnormality is not completely   excluded.    TVUS:  FINDINGS:  Uterus: 10.5 x 5.5 x 6.5 cm. Calcified fibroid is noted anteriorly   measuring up to 3.5 cm. Along the peripheral aspect of the posterior   uterus there is a heterogeneous ill-defined cystic region measuring 3.4 x   3.5 x 2.0 x 2.8 cm. There is surrounding hyperemia. The endometrial echo  complex cannot be visualized.    The bilateral ovaries are not visualized.    Fluid: Small free fluid seen within the cul-de-sac.    IMPRESSION:  Fibroid uterus.    Heterogeneous cystic structure seen along the posterior uterus with   surrounding hyperemia. This may represent an abscess or necrotic fibroid.   Correlate clinically. Contrast-enhanced pelvic MRI may be further   delineation.     MAMADOU CHISHOLM is a 67y , LMP 20 years ago, who presented to the ED for new onset rectal bleeding starting last night. She reports associated intermittent sharp pelvic pain that shoots down her legs. She has not taken anything for the pain. Her in the ED, CT scan was concerning 'a collection inseparable from the mid sigmoid colon and inseparable from the uterine border' with a follow up ultrasound that is concerning for possible necrotic fibroids vs. abscess. Patient denies any fevers, chills, nausea, vomiting, shortness of breath, palpitations or chest pain. GYN consulted for evaluation of possible pelvic abscess.=.      OB history:  x3, SABx1  GYN history: LMP 20 years ago, last pap smear unknown; denies hx abn pap smear; denies hx of STDs; known history of fibroids; hx of TOA - surgical report notes frozen abdomen, required surgical procedure noted below  Past medical history: HTN  Past surgical history: BTL, TOA s/p ileocecectomy and anastomosis  Medications: antihypertensives  Allergies: nkda    Physical Exam  T(C): 37.2 (23 @ 03:16), Max: 37.2 (23 @ 03:16)  HR: 75 (23 @ 03:16) (68 - 97)  BP: 149/83 (23 @ 03:16) (138/59 - 150/73)  RR: 19 (23 @ 03:16) (19 - 20)  SpO2: 99% (23 @ 03:16) (98% - 100%)    General: alert and oriented x3, no acute distress    Abdominal: Non- tender to palpation in all 4 quadrants. No guarding, no rebound tenderness.  Pelvic: normal external genitalia, no active vaginal bleeding, normal physiologic discharge, cervix within normal limits. No adnexal massess appreciated on exam, no CVA tenderness      Labs:                        10.7   10.07 )-----------( 459      ( 30 May 2023 15:45 )             34.7         139  |  101  |  13.0  ----------------------------<  92  3.9   |  27.0  |  0.86    Ca    9.8      30 May 2023 15:45    TPro  7.4  /  Alb  4.3  /  TBili  0.3<L>  /  DBili  x   /  AST  16  /  ALT  11  /  AlkPhos  96                  Imaging:   CT:  REPRODUCTIVE ORGANS: Uterus demonstrates calcified fibroid. Right adnexa   is unremarkable. Left adnexal low-attenuation well-circumscribed process   inseparable from the uterus and inseparable from the sigmoid colon. This   measures 3.5 x 2.1 cm    BOWEL: No bowel obstruction. Suture line in the right proximal ascending   colon. There is sigmoid colon diverticulosis with wall thickening of the   distal descending and proximal sigmoid colon. Possible abscess as   described above.  PERITONEUM: Small amount of pelvic ascites.  VESSELS: Atherosclerotic changes.  RETROPERITONEUM/LYMPH NODES: No lymphadenopathy.  ABDOMINAL WALL: Within normal limits.  BONES: Degenerative changes. Mild grade 1 anterolisthesis of L4 on L5    IMPRESSION:  No CT evidence of active GI bleeding. There is diverticulosis with   sigmoid colon diverticulitis. There is a collection inseparable from the   mid sigmoid colon and inseparable from the uterine border which on the   coronal images seems connected to the sigmoid colon. This may represent a   diverticular abscess. Uterine or adnexal abnormality is not completely   excluded.    TVUS:  FINDINGS:  Uterus: 10.5 x 5.5 x 6.5 cm. Calcified fibroid is noted anteriorly   measuring up to 3.5 cm. Along the peripheral aspect of the posterior   uterus there is a heterogeneous ill-defined cystic region measuring 3.4 x   3.5 x 2.0 x 2.8 cm. There is surrounding hyperemia. The endometrial echo  complex cannot be visualized.    The bilateral ovaries are not visualized.    Fluid: Small free fluid seen within the cul-de-sac.    IMPRESSION:  Fibroid uterus.    Heterogeneous cystic structure seen along the posterior uterus with   surrounding hyperemia. This may represent an abscess or necrotic fibroid.   Correlate clinically. Contrast-enhanced pelvic MRI may be further   delineation.

## 2023-05-31 NOTE — H&P ADULT - NSHPLABSRESULTS_GEN_ALL_CORE
Vital Signs Last 24 Hrs  T(C): 36.9 (31 May 2023 07:20), Max: 37.2 (31 May 2023 03:16)  T(F): 98.5 (31 May 2023 07:20), Max: 98.9 (31 May 2023 03:16)  HR: 64 (31 May 2023 07:20) (64 - 97)  BP: 136/63 (31 May 2023 07:20) (136/63 - 150/73)  BP(mean): --  RR: 18 (31 May 2023 07:20) (18 - 20)  SpO2: 99% (31 May 2023 07:20) (98% - 100%)    Parameters below as of 31 May 2023 07:20  Patient On (Oxygen Delivery Method): room air        LABS:                        10.7   10.07 )-----------( 459      ( 30 May 2023 15:45 )             34.7     05-30    139  |  101  |  13.0  ----------------------------<  92  3.9   |  27.0  |  0.86    Ca    9.8      30 May 2023 15:45    TPro  7.4  /  Alb  4.3  /  TBili  0.3<L>  /  DBili  x   /  AST  16  /  ALT  11  /  AlkPhos  96  05-30      Urinalysis Basic - ( 30 May 2023 22:59 )    Color: Yellow / Appearance: Clear / S.005 / pH: x  Gluc: x / Ketone: Negative  / Bili: Negative / Urobili: Negative mg/dL   Blood: x / Protein: Negative / Nitrite: Negative   Leuk Esterase: Negative / RBC: 3-5 /HPF / WBC 0-2 /HPF   Sq Epi: x / Non Sq Epi: x / Bacteria: Occasional      IMAGING:  CT A/P  IMPRESSION:  No CT evidence of active GI bleeding. There is diverticulosis with sigmoid colon diverticulitis. There is a collection inseparable from the mid sigmoid colon and inseparable from the uterine border which on the coronal images seems connected to the sigmoid colon. This may represent a diverticular abscess. Uterine or adnexal abnormality is not completely excluded.    MASSIMO CARRANZA MD; Attending Radiologist  This document has been electronically signed. May 30 2023 6:57PM        US Pelvis  IMPRESSION:  Fibroid uterus.  Heterogeneous cystic structure seen along the posterior uterus with surrounding hyperemia. This may represent an abscess or necrotic fibroid. Correlate clinically. Contrast-enhanced pelvic MRI may be further delineation.    OZZIE GONZALEZ MD; Attending Radiologist  This document has been electronically signed. May 31 2023 3:31AM

## 2023-05-31 NOTE — CONSULT NOTE ADULT - SUBJECTIVE AND OBJECTIVE BOX
SURGERY CONSULT  ==============================================================================================================  HPI: 67y Female with PMH of HTN and PSH of ex-lap for TOA that required ileocecectomy and anastomosis on . Patient reports that she came to the ED after 3 days of not being able to have a BM and today after passing stool and cleaning herself noted blood in the toilet paper. Patient reports that she has been more constipated, ahd a colonoscopy ~4 years ago and was normal.   On ED evaluation patient noted to have hemorrhoids, however, CTA performed showed no active bleeding and concern for sigmoid thickening related to a mass arising from the sigmoid, p[ossible abscess.       PAST MEDICAL & SURGICAL HISTORY:  HTN (hypertension)      Urinary tract infection      H/O tubal ligation        Home Meds: Home Medications:  acetaminophen-oxyCODONE 325 mg-5 mg oral tablet: 2 tab(s) orally every 6 hours, As needed, Severe Pain (10 Dec 2014 15:29)  ascorbic acid 500 mg oral tablet: 1 tab(s) orally once a day (10 Dec 2014 15:29)  docusate sodium 100 mg oral capsule: 1 cap(s) orally 2 times a day (10 Dec 2014 15:29)  ferrous sulfate: 325 milligram(s) orally 3 times a day (with meals) (10 Dec 2014 15:29)  folic acid 1 mg oral tablet: 1 tab(s) orally once a day (10 Dec 2014 15:29)  metoprolol tartrate 25 mg oral tablet: 1 tab(s) orally 2 times a day (10 Dec 2014 15:29)    Allergies: Allergies    No Known Allergies    Intolerances      Soc:   Advanced Directives: Presumed Full Code     CURRENT MEDICATIONS:   --------------------------------------------------------------------------------------  Neurologic Medications    Respiratory Medications    Cardiovascular Medications    Gastrointestinal Medications    Genitourinary Medications    Hematologic/Oncologic Medications    Antimicrobial/Immunologic Medications    Endocrine/Metabolic Medications    Topical/Other Medications    --------------------------------------------------------------------------------------    VITAL SIGNS, INS/OUTS (last 24 hours):  --------------------------------------------------------------------------------------  ICU Vital Signs Last 24 Hrs  T(C): 37 (30 May 2023 23:18), Max: 37.1 (30 May 2023 13:50)  T(F): 98.6 (30 May 2023 23:18), Max: 98.7 (30 May 2023 13:50)  HR: 68 (30 May 2023 23:18) (68 - 97)  BP: 138/59 (30 May 2023 23:18) (138/59 - 150/73)  BP(mean): --  ABP: --  ABP(mean): --  RR: 20 (30 May 2023 23:18) (19 - 20)  SpO2: 98% (30 May 2023 23:18) (98% - 100%)    O2 Parameters below as of 30 May 2023 23:18  Patient On (Oxygen Delivery Method): room air          I&O's Summary    --------------------------------------------------------------------------------------    PHYSICAL EXAM:  GENERAL: NAD, well-groomed, well-developed  HEAD:  Atraumatic, Normocephalic  EYES: EOMI, PERRLA, conjunctiva and sclera clear  NECK: Supple, No JVD  CHEST/LUNG: non-labored breathing on room air.   HEART: Regular rate and rhythm; S1/S2  ABDOMEN: Soft,  Nondistended, minimal tenderness, no rebound or guarding.   VASCULAR: Normal pulses, Normal capillary refill  EXTREMITIES:  2+ Peripheral Pulses, No cyanosis, No edema  SKIN: Warm, Intact       LABS  --------------------------------------------------------------------------------------  Labs:  CAPILLARY BLOOD GLUCOSE                              10.7   10.07 )-----------( 459      ( 30 May 2023 15:45 )             34.7       Auto Immature Granulocyte %: 0.3 % (23 @ 15:45)  Auto Neutrophil %: 80.8 % (23 @ 15:45)        139  |  101  |  13.0  ----------------------------<  92  3.9   |  27.0  |  0.86      Calcium, Total Serum: 9.8 mg/dL (23 @ 15:45)      LFTs:             7.4  | 0.3  | 16       ------------------[96      ( 30 May 2023 15:45 )  4.3  | x    | 11          Lipase:x      Amylase:x         Blood Gas Venous - Lactate: 1.10 mmol/L (23 @ 15:45)  Blood Gas Venous - Lactate: 1.00 mmol/L (23 @ 15:45)      Coags:    CARDIAC MARKERS ( 30 May 2023 15:45 )  x     / <0.01 ng/mL / x     / x     / x              Urinalysis Basic - ( 30 May 2023 22:59 )    Color: Yellow / Appearance: Clear / S.005 / pH: x  Gluc: x / Ketone: Negative  / Bili: Negative / Urobili: Negative mg/dL   Blood: x / Protein: Negative / Nitrite: Negative   Leuk Esterase: Negative / RBC: 3-5 /HPF / WBC 0-2 /HPF   Sq Epi: x / Non Sq Epi: x / Bacteria: Occasional          --------------------------------------------------------------------------------------

## 2023-05-31 NOTE — H&P ADULT - NSICDXPASTMEDICALHX_GEN_ALL_CORE_FT
PAST MEDICAL HISTORY:  HTN (hypertension)     TOA (tubo-ovarian abscess)     Urinary tract infection

## 2023-05-31 NOTE — ED ADULT NURSE REASSESSMENT NOTE - NS ED NURSE REASSESS COMMENT FT1
pt resting quietly in stretcher respirations even and unlabored. pt reports mild pain but stated she didn't want any medications for the pain. No new orders at this time pt safety maintained.

## 2023-05-31 NOTE — ED ADULT NURSE REASSESSMENT NOTE - NS ED NURSE REASSESS COMMENT FT1
Patient is resting, NAD, reports having rectal bleeding when wiping yesterday, denies abdominal pain at the moment. call bell in reach. Waiting for admission.

## 2023-05-31 NOTE — H&P ADULT - NSHPPHYSICALEXAM_GEN_ALL_CORE
GENERAL: NAD, well-groomed, well-developed  HEAD:  Atraumatic, Normocephalic  EYES: EOMI, conjunctiva and sclera clear  NECK: Supple,   CHEST/LUNG: non-labored breathing on room air.   HEART: Regular rate and rhythm; S1/S2  ABDOMEN: Soft,  Nondistended, mild tenderness at LLQ, no rebound or guarding.   VASCULAR: Normal pulses, Normal capillary refill  EXTREMITIES:  2+ Peripheral Pulses, No cyanosis, No edema  SKIN: Warm, Intact

## 2023-05-31 NOTE — CONSULT NOTE ADULT - TIME BILLING
Patient seen and examined, chart reviewed.  This is a 67yoF who presented with a chief complaint of hematochezia and LLQ abdominal discomfort.  Imaging revealed thickening of the sigmoid colon as well as possible abscess between sigmoid and uterus, which per read appears to be arising from sigmoid, possible sigmoid diverticulitis.  Labs with no leukocytosis; anemia to 9.9 Hgb; mild hypokalemia and hypomagnesemia.    Currently pelvic MRI pending for further workup of lesion  Ultrasound pending  Follow up imaging  NPO, IVF  Serial abdominal exams  IV antibiotics

## 2023-05-31 NOTE — H&P ADULT - ASSESSMENT
66 yo F presenting with possibly hemorrhoidal bleed, found on work up to have intra-abdominal abscess vs necrotic fibroid. Possible acute diverticulitis with abscess though history inconsistent. Possible uterine in source. Will treat as diverticulitis and obtain additional imaging.     - Admit to CRS under Dr. Moya  - NPO / IVF  - IV Abx  - MRI Pelvis  - PRN pain  - DVT ppx      Discussed with attending

## 2023-05-31 NOTE — CONSULT NOTE ADULT - ATTENDING COMMENTS
67y , LMP 20 years ago, who presented to the ED for new onset rectal bleeding starting last night. She reports associated intermittent sharp pelvic pain that shoots down her legs. She has not taken anything for the pain. Her in the ED, CT scan was concerning 'a collection inseparable from the mid sigmoid colon and inseparable from the uterine border' with a follow up ultrasound that is concerning for possible necrotic fibroids vs. abscess. Patient denies any fevers, chills, nausea, vomiting, shortness of breath, palpitations or chest pain. GYN consulted for evaluation of possible pelvic abscess    I agree with resident assessment     Imaging demonstrates calcified fibroids.  Low suspicion for degenerating fibroid in 66 yo  Pelvic exam findings benign  -CT scan and ultrasound unable to delineate etiology of the pelvic pain and possible abscess. Reviewing operative reports from , the abdomen was frozen with adhesions likely the reason for difficulty delineating structures on imaging.  -Recommend MRI, as suggested by radiology, to further categorize collection note  -Based on examination does not appear to be GYN in origin    High suspicion for diverticular abscess.   Please refer back to colorectal surgery team    Dr Lopez

## 2023-05-31 NOTE — CONSULT NOTE ADULT - ASSESSMENT
66 yo F presenting with likely hemorrhoidal bleed, found to have intraabominal abscess, unclear if aadnexal or sigmoid in origin.     Will get pelvic US to delineate origin  If adnexal/uterus recommend GYN eval  If sigmoid in nature, will admit to colorectal and consult IR

## 2023-05-31 NOTE — CONSULT NOTE ADULT - ASSESSMENT
MAMADOU CHISHOLM is a 67y , LMP 20 years ago, who presented to the ED for new onset rectal bleeding starting last night. GYN consulted for evaluation of possible pelvic abscess.     MAMADOU CHISHOLM is a 67y , LMP 20 years ago, who presented to the ED for new onset rectal bleeding starting last night. GYN consulted for evaluation of possible pelvic abscess.    -VSS  -No leukocytosis  -Pelvic exam findings benign  -CT scan and ultrasound unable to delineate etiology of the pelvic pain and possible abscess. Reviewing operative reports from , the abdomen was frozen with adhesions likely the reason for difficulty delineating structures on imaging.  -Recommend MRI, as suggested by radiology, to further categorize collection noted     MAMADOU CHISHOLM is a 67y , LMP 20 years ago, who presented to the ED for new onset rectal bleeding starting last night. GYN consulted for evaluation of possible pelvic abscess.    -VSS  -No leukocytosis  -Pelvic exam findings benign  -CT scan and ultrasound unable to delineate etiology of the pelvic pain and possible abscess. Reviewing operative reports from , the abdomen was frozen with adhesions likely the reason for difficulty delineating structures on imaging.  -Recommend MRI, as suggested by radiology, to further categorize collection note  -Based on examination does not appear to be GYN in origin  -Discussed findings with Dr. Jha    D/w Dr. Lopez

## 2023-05-31 NOTE — ED ADULT NURSE REASSESSMENT NOTE - NS ED NURSE REASSESS COMMENT FT1
pt resting quietly in stretcher respirations even and unlabored. NAD noted at this time. pt awaiting admission. No new orders at this time pt safety maintained.

## 2023-05-31 NOTE — H&P ADULT - HISTORY OF PRESENT ILLNESS
67y Female with PMH of HTN and PSH of ex-lap for TOA that required ileocecectomy and anastomosis on 2014. Patient reports that she came to the ED after 3 days of not being able to have a BM and today after passing stool and cleaning herself noted blood in the toilet paper. Patient reports that she has been more constipated, had a colonoscopy ~4 years ago and was normal. DOes have abdominal pain while having a bowel movement. On ED evaluation patient noted to have hemorrhoids, however, CTA performed showed no active bleeding and concern for sigmoid thickening related to a mass arising from the sigmoid, possible abscess. However unable to discern from uterine source, with follow up ultrasound reporting possible necrotic uterine fibroid.

## 2023-06-01 LAB
ANION GAP SERPL CALC-SCNC: 14 MMOL/L — SIGNIFICANT CHANGE UP (ref 5–17)
BUN SERPL-MCNC: 9 MG/DL — SIGNIFICANT CHANGE UP (ref 8–20)
CALCIUM SERPL-MCNC: 9.6 MG/DL — SIGNIFICANT CHANGE UP (ref 8.4–10.5)
CHLORIDE SERPL-SCNC: 95 MMOL/L — LOW (ref 96–108)
CO2 SERPL-SCNC: 23 MMOL/L — SIGNIFICANT CHANGE UP (ref 22–29)
CREAT SERPL-MCNC: 0.78 MG/DL — SIGNIFICANT CHANGE UP (ref 0.5–1.3)
EGFR: 84 ML/MIN/1.73M2 — SIGNIFICANT CHANGE UP
GLUCOSE SERPL-MCNC: 72 MG/DL — SIGNIFICANT CHANGE UP (ref 70–99)
HCT VFR BLD CALC: 33.6 % — LOW (ref 34.5–45)
HCV AB S/CO SERPL IA: 0.12 S/CO — SIGNIFICANT CHANGE UP (ref 0–0.99)
HCV AB SERPL-IMP: SIGNIFICANT CHANGE UP
HGB BLD-MCNC: 10.4 G/DL — LOW (ref 11.5–15.5)
MAGNESIUM SERPL-MCNC: 1.6 MG/DL — SIGNIFICANT CHANGE UP (ref 1.6–2.6)
MCHC RBC-ENTMCNC: 28.5 PG — SIGNIFICANT CHANGE UP (ref 27–34)
MCHC RBC-ENTMCNC: 31 GM/DL — LOW (ref 32–36)
MCV RBC AUTO: 92.1 FL — SIGNIFICANT CHANGE UP (ref 80–100)
PHOSPHATE SERPL-MCNC: 3.3 MG/DL — SIGNIFICANT CHANGE UP (ref 2.4–4.7)
PLATELET # BLD AUTO: 434 K/UL — HIGH (ref 150–400)
POTASSIUM SERPL-MCNC: 3.8 MMOL/L — SIGNIFICANT CHANGE UP (ref 3.5–5.3)
POTASSIUM SERPL-SCNC: 3.8 MMOL/L — SIGNIFICANT CHANGE UP (ref 3.5–5.3)
RBC # BLD: 3.65 M/UL — LOW (ref 3.8–5.2)
RBC # FLD: 12.5 % — SIGNIFICANT CHANGE UP (ref 10.3–14.5)
SODIUM SERPL-SCNC: 132 MMOL/L — LOW (ref 135–145)
WBC # BLD: 8.64 K/UL — SIGNIFICANT CHANGE UP (ref 3.8–10.5)
WBC # FLD AUTO: 8.64 K/UL — SIGNIFICANT CHANGE UP (ref 3.8–10.5)

## 2023-06-01 PROCEDURE — 72196 MRI PELVIS W/DYE: CPT | Mod: 26

## 2023-06-01 PROCEDURE — 99232 SBSQ HOSP IP/OBS MODERATE 35: CPT | Mod: GC

## 2023-06-01 RX ORDER — POTASSIUM CHLORIDE 20 MEQ
20 PACKET (EA) ORAL ONCE
Refills: 0 | Status: COMPLETED | OUTPATIENT
Start: 2023-06-01 | End: 2023-06-01

## 2023-06-01 RX ORDER — ENOXAPARIN SODIUM 100 MG/ML
40 INJECTION SUBCUTANEOUS EVERY 24 HOURS
Refills: 0 | Status: DISCONTINUED | OUTPATIENT
Start: 2023-06-01 | End: 2023-06-01

## 2023-06-01 RX ORDER — MAGNESIUM SULFATE 500 MG/ML
2 VIAL (ML) INJECTION ONCE
Refills: 0 | Status: COMPLETED | OUTPATIENT
Start: 2023-06-01 | End: 2023-06-01

## 2023-06-01 RX ADMIN — PIPERACILLIN AND TAZOBACTAM 25 GRAM(S): 4; .5 INJECTION, POWDER, LYOPHILIZED, FOR SOLUTION INTRAVENOUS at 15:45

## 2023-06-01 RX ADMIN — ENOXAPARIN SODIUM 40 MILLIGRAM(S): 100 INJECTION SUBCUTANEOUS at 11:12

## 2023-06-01 RX ADMIN — SODIUM CHLORIDE 100 MILLILITER(S): 9 INJECTION, SOLUTION INTRAVENOUS at 11:13

## 2023-06-01 RX ADMIN — PIPERACILLIN AND TAZOBACTAM 25 GRAM(S): 4; .5 INJECTION, POWDER, LYOPHILIZED, FOR SOLUTION INTRAVENOUS at 21:24

## 2023-06-01 RX ADMIN — SODIUM CHLORIDE 100 MILLILITER(S): 9 INJECTION, SOLUTION INTRAVENOUS at 05:18

## 2023-06-01 RX ADMIN — Medication 1 MILLIGRAM(S): at 11:13

## 2023-06-01 RX ADMIN — Medication 20 MILLIEQUIVALENT(S): at 15:45

## 2023-06-01 RX ADMIN — ATORVASTATIN CALCIUM 20 MILLIGRAM(S): 80 TABLET, FILM COATED ORAL at 21:24

## 2023-06-01 RX ADMIN — Medication 25 GRAM(S): at 11:13

## 2023-06-01 RX ADMIN — Medication 500 MILLIGRAM(S): at 11:12

## 2023-06-01 RX ADMIN — AMLODIPINE BESYLATE 10 MILLIGRAM(S): 2.5 TABLET ORAL at 16:54

## 2023-06-01 RX ADMIN — PIPERACILLIN AND TAZOBACTAM 25 GRAM(S): 4; .5 INJECTION, POWDER, LYOPHILIZED, FOR SOLUTION INTRAVENOUS at 05:17

## 2023-06-01 NOTE — PROGRESS NOTE ADULT - ATTENDING COMMENTS
Patient seen and examined. She reports similar abdominal pain since admission although her reason for presentation was the bloody diarrhea and not pain. She is asking to eat. Hemodynamically stable and WBC normal.  Hgb is stable. Abdomen is soft, non distended and non tender to palpation. Midline abdominal incision noted. CT reviewed and looks like diverticulitis. There is no drainable collection and as such does not need IR drain. MRI ordered to evaluate if it is of gynecologic origin but if they are not able to accommodate the test today, will recommend it being done as outpatient. Also recommend outpatient colonoscopy

## 2023-06-02 ENCOUNTER — TRANSCRIPTION ENCOUNTER (OUTPATIENT)
Age: 68
End: 2023-06-02

## 2023-06-02 VITALS
HEART RATE: 62 BPM | DIASTOLIC BLOOD PRESSURE: 72 MMHG | OXYGEN SATURATION: 97 % | RESPIRATION RATE: 18 BRPM | SYSTOLIC BLOOD PRESSURE: 117 MMHG | TEMPERATURE: 98 F

## 2023-06-02 LAB
ANION GAP SERPL CALC-SCNC: 11 MMOL/L — SIGNIFICANT CHANGE UP (ref 5–17)
BASOPHILS # BLD AUTO: 0.02 K/UL — SIGNIFICANT CHANGE UP (ref 0–0.2)
BASOPHILS NFR BLD AUTO: 0.3 % — SIGNIFICANT CHANGE UP (ref 0–2)
BUN SERPL-MCNC: 8.8 MG/DL — SIGNIFICANT CHANGE UP (ref 8–20)
CALCIUM SERPL-MCNC: 9 MG/DL — SIGNIFICANT CHANGE UP (ref 8.4–10.5)
CHLORIDE SERPL-SCNC: 100 MMOL/L — SIGNIFICANT CHANGE UP (ref 96–108)
CO2 SERPL-SCNC: 26 MMOL/L — SIGNIFICANT CHANGE UP (ref 22–29)
CREAT SERPL-MCNC: 0.77 MG/DL — SIGNIFICANT CHANGE UP (ref 0.5–1.3)
EGFR: 86 ML/MIN/1.73M2 — SIGNIFICANT CHANGE UP
EOSINOPHIL # BLD AUTO: 0.08 K/UL — SIGNIFICANT CHANGE UP (ref 0–0.5)
EOSINOPHIL NFR BLD AUTO: 1.2 % — SIGNIFICANT CHANGE UP (ref 0–6)
GLUCOSE SERPL-MCNC: 101 MG/DL — HIGH (ref 70–99)
HCT VFR BLD CALC: 31.5 % — LOW (ref 34.5–45)
HGB BLD-MCNC: 9.9 G/DL — LOW (ref 11.5–15.5)
IMM GRANULOCYTES NFR BLD AUTO: 0.3 % — SIGNIFICANT CHANGE UP (ref 0–0.9)
LYMPHOCYTES # BLD AUTO: 1.34 K/UL — SIGNIFICANT CHANGE UP (ref 1–3.3)
LYMPHOCYTES # BLD AUTO: 20 % — SIGNIFICANT CHANGE UP (ref 13–44)
MAGNESIUM SERPL-MCNC: 2 MG/DL — SIGNIFICANT CHANGE UP (ref 1.6–2.6)
MCHC RBC-ENTMCNC: 28 PG — SIGNIFICANT CHANGE UP (ref 27–34)
MCHC RBC-ENTMCNC: 31.4 GM/DL — LOW (ref 32–36)
MCV RBC AUTO: 89 FL — SIGNIFICANT CHANGE UP (ref 80–100)
MONOCYTES # BLD AUTO: 0.57 K/UL — SIGNIFICANT CHANGE UP (ref 0–0.9)
MONOCYTES NFR BLD AUTO: 8.5 % — SIGNIFICANT CHANGE UP (ref 2–14)
NEUTROPHILS # BLD AUTO: 4.66 K/UL — SIGNIFICANT CHANGE UP (ref 1.8–7.4)
NEUTROPHILS NFR BLD AUTO: 69.7 % — SIGNIFICANT CHANGE UP (ref 43–77)
PHOSPHATE SERPL-MCNC: 2.9 MG/DL — SIGNIFICANT CHANGE UP (ref 2.4–4.7)
PLATELET # BLD AUTO: 425 K/UL — HIGH (ref 150–400)
POTASSIUM SERPL-MCNC: 3.9 MMOL/L — SIGNIFICANT CHANGE UP (ref 3.5–5.3)
POTASSIUM SERPL-SCNC: 3.9 MMOL/L — SIGNIFICANT CHANGE UP (ref 3.5–5.3)
RBC # BLD: 3.54 M/UL — LOW (ref 3.8–5.2)
RBC # FLD: 12.2 % — SIGNIFICANT CHANGE UP (ref 10.3–14.5)
SODIUM SERPL-SCNC: 137 MMOL/L — SIGNIFICANT CHANGE UP (ref 135–145)
WBC # BLD: 6.69 K/UL — SIGNIFICANT CHANGE UP (ref 3.8–10.5)
WBC # FLD AUTO: 6.69 K/UL — SIGNIFICANT CHANGE UP (ref 3.8–10.5)

## 2023-06-02 PROCEDURE — 86850 RBC ANTIBODY SCREEN: CPT

## 2023-06-02 PROCEDURE — 84295 ASSAY OF SERUM SODIUM: CPT

## 2023-06-02 PROCEDURE — 82947 ASSAY GLUCOSE BLOOD QUANT: CPT

## 2023-06-02 PROCEDURE — 85025 COMPLETE CBC W/AUTO DIFF WBC: CPT

## 2023-06-02 PROCEDURE — 99232 SBSQ HOSP IP/OBS MODERATE 35: CPT

## 2023-06-02 PROCEDURE — 86901 BLOOD TYPING SEROLOGIC RH(D): CPT

## 2023-06-02 PROCEDURE — 86900 BLOOD TYPING SEROLOGIC ABO: CPT

## 2023-06-02 PROCEDURE — 81001 URINALYSIS AUTO W/SCOPE: CPT

## 2023-06-02 PROCEDURE — 93005 ELECTROCARDIOGRAM TRACING: CPT

## 2023-06-02 PROCEDURE — 82272 OCCULT BLD FECES 1-3 TESTS: CPT

## 2023-06-02 PROCEDURE — 85027 COMPLETE CBC AUTOMATED: CPT

## 2023-06-02 PROCEDURE — 84132 ASSAY OF SERUM POTASSIUM: CPT

## 2023-06-02 PROCEDURE — 36415 COLL VENOUS BLD VENIPUNCTURE: CPT

## 2023-06-02 PROCEDURE — 99285 EMERGENCY DEPT VISIT HI MDM: CPT | Mod: 25

## 2023-06-02 PROCEDURE — 85014 HEMATOCRIT: CPT

## 2023-06-02 PROCEDURE — 83605 ASSAY OF LACTIC ACID: CPT

## 2023-06-02 PROCEDURE — 76830 TRANSVAGINAL US NON-OB: CPT

## 2023-06-02 PROCEDURE — 80048 BASIC METABOLIC PNL TOTAL CA: CPT

## 2023-06-02 PROCEDURE — 74178 CT ABD&PLV WO CNTR FLWD CNTR: CPT | Mod: MA

## 2023-06-02 PROCEDURE — 84100 ASSAY OF PHOSPHORUS: CPT

## 2023-06-02 PROCEDURE — 86803 HEPATITIS C AB TEST: CPT

## 2023-06-02 PROCEDURE — 83735 ASSAY OF MAGNESIUM: CPT

## 2023-06-02 PROCEDURE — 96374 THER/PROPH/DIAG INJ IV PUSH: CPT

## 2023-06-02 PROCEDURE — 80053 COMPREHEN METABOLIC PANEL: CPT

## 2023-06-02 PROCEDURE — 76856 US EXAM PELVIC COMPLETE: CPT

## 2023-06-02 PROCEDURE — 72196 MRI PELVIS W/DYE: CPT | Mod: MA

## 2023-06-02 PROCEDURE — 82330 ASSAY OF CALCIUM: CPT

## 2023-06-02 PROCEDURE — 71045 X-RAY EXAM CHEST 1 VIEW: CPT

## 2023-06-02 PROCEDURE — 84484 ASSAY OF TROPONIN QUANT: CPT

## 2023-06-02 PROCEDURE — 85018 HEMOGLOBIN: CPT

## 2023-06-02 PROCEDURE — 82803 BLOOD GASES ANY COMBINATION: CPT

## 2023-06-02 PROCEDURE — 82435 ASSAY OF BLOOD CHLORIDE: CPT

## 2023-06-02 RX ORDER — ENOXAPARIN SODIUM 100 MG/ML
40 INJECTION SUBCUTANEOUS EVERY 24 HOURS
Refills: 0 | Status: DISCONTINUED | OUTPATIENT
Start: 2023-06-02 | End: 2023-06-02

## 2023-06-02 RX ORDER — ACETAMINOPHEN 500 MG
2 TABLET ORAL
Qty: 0 | Refills: 0 | DISCHARGE
Start: 2023-06-02

## 2023-06-02 RX ORDER — POTASSIUM CHLORIDE 20 MEQ
10 PACKET (EA) ORAL ONCE
Refills: 0 | Status: COMPLETED | OUTPATIENT
Start: 2023-06-02 | End: 2023-06-02

## 2023-06-02 RX ADMIN — Medication 650 MILLIGRAM(S): at 05:53

## 2023-06-02 RX ADMIN — Medication 500 MILLIGRAM(S): at 13:58

## 2023-06-02 RX ADMIN — Medication 1 MILLIGRAM(S): at 13:58

## 2023-06-02 RX ADMIN — Medication 650 MILLIGRAM(S): at 13:58

## 2023-06-02 RX ADMIN — Medication 63.75 MILLIMOLE(S): at 11:11

## 2023-06-02 RX ADMIN — Medication 10 MILLIEQUIVALENT(S): at 09:14

## 2023-06-02 RX ADMIN — PIPERACILLIN AND TAZOBACTAM 25 GRAM(S): 4; .5 INJECTION, POWDER, LYOPHILIZED, FOR SOLUTION INTRAVENOUS at 05:41

## 2023-06-02 RX ADMIN — LOSARTAN POTASSIUM 100 MILLIGRAM(S): 100 TABLET, FILM COATED ORAL at 05:35

## 2023-06-02 NOTE — PROGRESS NOTE ADULT - REASON FOR ADMISSION
Acute Diverticulitis w abscess, possible uterine source
Acute Diverticulitis w abscess, possible uterine source

## 2023-06-02 NOTE — DISCHARGE NOTE PROVIDER - CARE PROVIDER_API CALL
Nmio Moya  Colon/Rectal Surgery  [address], [place] [address]  Phone: [phone]  Fax: [phone]  Follow Up Time:

## 2023-06-02 NOTE — DISCHARGE NOTE NURSING/CASE MANAGEMENT/SOCIAL WORK - NSDCPEFALRISK_GEN_ALL_CORE
For information on Fall & Injury Prevention, visit: [email or web address] OR  [email or web address] OR  https://www.cdc.gov/steadi/patient.html

## 2023-06-02 NOTE — DISCHARGE NOTE PROVIDER - NSDCCPCAREPLAN_GEN_ALL_CORE_FT
PRINCIPAL DISCHARGE DIAGNOSIS  Diagnosis: Colonic diverticular abscess  Assessment and Plan of Treatment:   ACTIVITY: No heavy lifting or straining. Otherwise, you may return to your usual level of physical activity.   DIET: Return to A low fiber diet.   NOTIFY YOUR SURGEON IF: You have any bleeding that does not stop, any pus draining from your wound(s), any fever (over 100.4 F) or chills, persistent nausea/vomiting, persistent diarrhea, or if your pain is not controlled.  FOLLOW-UP: Please follow up with Dr. Moya in 2 weeks regarding your hospitalization. Call for appointment upon discharge.  You may take tylenol and/or motrin for pain.   You will need to complete your course of antibiotics you have been prescribed,

## 2023-06-02 NOTE — DISCHARGE NOTE PROVIDER - HOSPITAL COURSE
67y Female with PMH of HTN and PSH of ex-lap for TOA that required ileocecectomy and anastomosis on 2014 presented with abdominal pain on 5/31, labs and imaging consistent with diverticulitis with abscess. Patient was admitted to colorectal service, placed on IV abx. Hospital course was uneventful, diet was advanced and upon discharge tolerating low fiber diet. Upon discharge, wbc normal, tolerating diet, pain controlled, ambulating and voiding independently, cleared for discharge home per attending.   Appropriate meds sent to pharmacy.

## 2023-06-02 NOTE — PROGRESS NOTE ADULT - ASSESSMENT
68 yo F presenting with possibly hemorrhoidal bleed, found on work up to have intra-abdominal abscess vs necrotic fibroid. Possible acute diverticulitis with abscess though history inconsistent. Possible uterine in source. Will treat as diverticulitis and obtain additional imaging.     - NPO / IVF  - IV Abx  - MRI Pelvis  - PRN pain  - possible IR s/p MRI  - will resume chem dvt ppx after discussed with IR  
68 yo F presenting with possibly hemorrhoidal bleed, found on work up to have intra-abdominal abscess, 2.7cm adjacent to sigmoid colon with diverticulitis, on IV Abx.      - CLD, consider advancing diet today to low residue   - IV Abx  - PRN pain  - DC planning with possible discharge to home today   - DVT ppx

## 2023-06-02 NOTE — DISCHARGE NOTE NURSING/CASE MANAGEMENT/SOCIAL WORK - PATIENT PORTAL LINK FT
You can access the FollowMyHealth Patient Portal offered by [facility] by registering at the following website: [email or web address]. By joining [employer]’s FollowMyHealth portal, you will also be able to view your health information using other applications (apps) compatible with our system.

## 2023-06-02 NOTE — DISCHARGE NOTE PROVIDER - NSDCMRMEDTOKEN_GEN_ALL_CORE_FT
acetaminophen 325 mg oral tablet: 2 tab(s) orally every 6 hours  amLODIPine 10 mg oral tablet: 1 orally once a day  amoxicillin-clavulanate 875 mg-125 mg oral tablet: 1 tab(s) orally 2 times a day  ascorbic acid 500 mg oral tablet: 1 tab(s) orally once a day  atorvastatin 20 mg oral tablet: 1 orally once a day  docusate sodium 100 mg oral capsule: 1 cap(s) orally 2 times a day  ferrous sulfate: 325 milligram(s) orally 3 times a day (with meals)  folic acid 1 mg oral tablet: 1 tab(s) orally once a day  losartan 100 mg oral tablet: 1 orally once a day  Valium 2 mg oral tablet: 1 tab(s) orally 2 times a day MDD:2

## 2023-06-02 NOTE — PROGRESS NOTE ADULT - TIME BILLING
S&E  MRI results noted.  Overall, feels improved.  Passing flatus.  Denies stool.  AFVSS  NAD  soft, NTND  Labs reviewed  Imaging reports reviewed  A/P - First episode diverticulitis with small abscess interposed btwn sigmoid and uterus  Trial of low fiber diet today.  HLIV.  If tolerates, can d/c home on augmentin with outpt followup.

## 2023-06-02 NOTE — PROGRESS NOTE ADULT - SUBJECTIVE AND OBJECTIVE BOX
INTERVAL HPI/OVERNIGHT EVENTS:    Patient evaluated at bedside. No acute distress. No acute events overnight.  Tolerating CLD, no nausea or vomiting, no fever or chills. No bloody stools.      MEDICATIONS  (STANDING):  acetaminophen     Tablet .. 650 milliGRAM(s) Oral every 6 hours  amLODIPine   Tablet 10 milliGRAM(s) Oral daily  ascorbic acid 500 milliGRAM(s) Oral daily  atorvastatin 20 milliGRAM(s) Oral at bedtime  enoxaparin Injectable 40 milliGRAM(s) SubCutaneous every 24 hours  folic acid 1 milliGRAM(s) Oral daily  losartan 100 milliGRAM(s) Oral daily  piperacillin/tazobactam IVPB.. 3.375 Gram(s) IV Intermittent every 8 hours    MEDICATIONS  (PRN):      Vital Signs Last 24 Hrs  T(C): 36.9 (02 Jun 2023 05:46), Max: 37 (01 Jun 2023 15:30)  T(F): 98.4 (02 Jun 2023 05:46), Max: 98.6 (01 Jun 2023 15:30)  HR: 69 (02 Jun 2023 05:46) (64 - 71)  BP: 130/67 (02 Jun 2023 05:46) (116/63 - 143/66)  BP(mean): 92 (01 Jun 2023 15:30) (92 - 92)  RR: 16 (02 Jun 2023 05:46) (16 - 18)  SpO2: 98% (02 Jun 2023 05:46) (98% - 100%)    Parameters below as of 02 Jun 2023 01:30  Patient On (Oxygen Delivery Method): room air    Constitutional: NAD  HEENT: PERRLA, EOMI, no drainage or redness  Respiratory: Breath Sounds equal & clear to percussion & auscultation, no accessory muscle use  Cardiovascular: Regular rate & rhythm, normal S1, S2; no murmurs, gallops or rubs; no S3, S4  Gastrointestinal: Soft, tender in LLQ, no rebound or guarding     I&O's Detail      LABS:                        10.4   8.64  )-----------( 434      ( 01 Jun 2023 02:50 )             33.6     06-01    132<L>  |  95<L>  |  9.0  ----------------------------<  72  3.8   |  23.0  |  0.78    Ca    9.6      01 Jun 2023 08:53  Phos  3.3     06-01  Mg     1.6     06-01    RADIOLOGY & ADDITIONAL STUDIES:    MRI: Collection in the posterior uterus on the left measuring 2.7 x 2.4 cm consistent with an abscess; sigmoid colon directly adjacent to the uterus at the level of the abscess with a suggestion of a tract; sigmoid diverticulitis is favored to be the etiology of the abscess.
INTERVAL HPI/OVERNIGHT EVENTS:    Patient evaluated at bedside. NAOE. Denies N/V/CP/SOB, no subjective fevers or chills. Continues to have bowel function, though notes diarrhea.    MEDICATIONS  (STANDING):  acetaminophen     Tablet .. 650 milliGRAM(s) Oral every 6 hours  amLODIPine   Tablet 10 milliGRAM(s) Oral daily  ascorbic acid 500 milliGRAM(s) Oral daily  atorvastatin 20 milliGRAM(s) Oral at bedtime  folic acid 1 milliGRAM(s) Oral daily  lactated ringers. 1000 milliLiter(s) (100 mL/Hr) IV Continuous <Continuous>  losartan 100 milliGRAM(s) Oral daily  piperacillin/tazobactam IVPB.. 3.375 Gram(s) IV Intermittent every 8 hours    MEDICATIONS  (PRN):      Vital Signs Last 24 Hrs  T(C): 36.7 (2023 04:22), Max: 37.2 (31 May 2023 15:17)  T(F): 98.1 (2023 04:22), Max: 99 (31 May 2023 15:17)  HR: 76 (2023 04:22) (63 - 89)  BP: 118/73 (2023 04:22) (115/55 - 158/70)  BP(mean): 82 (31 May 2023 19:21) (82 - 99)  RR: 18 (2023 04:22) (18 - 18)  SpO2: 98% (2023 04:22) (97% - 100%)    Parameters below as of 2023 04:22  Patient On (Oxygen Delivery Method): room air      Constitutional: NAD  HEENT: PERRLA, EOMI, no drainage or redness  Respiratory: respirations even, unlabored on room air  Cardiovascular: RRR  Gastrointestinal: Soft, minimal TTP, non-distended  Extremities: No peripheral edema, No cyanosis, clubbing   Neurological: A&O x 3; no sensory, motor or coordination deficits, normal reflexes  Psychiatric: Normal mood, normal affect  Musculoskeletal: No joint pain, swelling or deformity; no limitation of movement  Skin: No rashes      I&O's Detail      LABS:                        9.9    7.43  )-----------( 415      ( 31 May 2023 08:41 )             31.4     05-31    137  |  100  |  8.7  ----------------------------<  94  3.6   |  25.0  |  0.75    Ca    9.5      31 May 2023 08:41  Phos  3.0       Mg     1.7         TPro  7.4  /  Alb  4.3  /  TBili  0.3<L>  /  DBili  x   /  AST  16  /  ALT  11  /  AlkPhos  96        Urinalysis Basic - ( 30 May 2023 22:59 )    Color: Yellow / Appearance: Clear / S.005 / pH: x  Gluc: x / Ketone: Negative  / Bili: Negative / Urobili: Negative mg/dL   Blood: x / Protein: Negative / Nitrite: Negative   Leuk Esterase: Negative / RBC: 3-5 /HPF / WBC 0-2 /HPF   Sq Epi: x / Non Sq Epi: x / Bacteria: Occasional        RADIOLOGY & ADDITIONAL STUDIES:  < from: US Pelvis Complete (US Pelvis Complete .) (23 @ 02:20) >  Heterogeneous cystic structure seen along the posterior uterus with   surrounding hyperemia. This may represent an abscess or necrotic fibroid.   Correlate clinically. Contrast-enhanced pelvic MRI may be further   delineation.    < end of copied text >

## 2023-06-05 PROBLEM — N70.93 SALPINGITIS AND OOPHORITIS, UNSPECIFIED: Chronic | Status: ACTIVE | Noted: 2023-05-31

## 2023-06-13 NOTE — CDI QUERY NOTE - NSCDIOTHERTXTBX_GEN_ALL_CORE_HH
Diverticulitis location and type.     This patient is documented with "diverticulitis with abscess" and "intra-abdominal abscess, 2.7cm adjacent to sigmoid colon with diverticulitis."  MRI showed "Collection in the posterior uterus on the left measuring 2.7 x 2.4 cm consistent with an abscess; sigmoid colon directly adjacent to the uterus at the level of the abscess with a suggestion of a tract; sigmoid diverticulitis is favored to be the etiology of the abscess."      Can the type and location of the abscess be clarified, after study?    -Patient was found to have diverticulitis with an associated intraabdominal abscess that was adjacent to and outside of the colon treated with antibiotics  -Patient was found to have diverticulitis with an associated peritoneal abscess that was adjacent to and outside of the colon treated with antibiotics  -Patient was found to have diverticulitis with an associated abscess that was inside the lumen of the colon treated with antibiotics  -Other (please specify) treated with antibiotics    SUPPORTING DOCUMENTATION:    - 5/31 H&P: Reason for Admission: Acute Diverticulitis w abscess, possible uterine source... found on work up to have intra-abdominal abscess vs necrotic fibroid. Possible acute diverticulitis with abscess though history inconsistent. Possible uterine in source. Will treat as diverticulitis and obtain additional imaging    -6/2 Colorectal Surgery: MRI: Collection in the posterior uterus on the left measuring 2.7 x 2.4 cm consistent with an abscess; sigmoid colon directly adjacent to the uterus at the level of the abscess with a suggestion of a tract; sigmoid diverticulitis is favored to be the etiology of the abscess... presenting with possibly hemorrhoidal bleed, found on work up to have intra-abdominal abscess, 2.7cm adjacent to sigmoid colon with diverticulitis, on IV Abx    -6/2 CR Surgery/Attestation: First episode diverticulitis with small abscess interposed btwn sigmoid and uterus    -6/2 DC: Acute Diverticulitis w abscess, possible uterine source... labs and imaging consistent with diverticulitis with abscess. Patient was admitted to colorectal service, placed on IV abx... Colonic diverticular abscess    < from: MR Pelvis w/ IV Cont (06.01.23 @ 15:41) >  IMPRESSION:  Collection in the posterior uterus on the left measuring 2.7 x 2.4 cm consistent with an abscess; sigmoid colon directly adjacent to the uterus   at the level of the abscess with a suggestion of a tract; sigmoid diverticulitis is favored to be the etiology of the abscess.  < end of copied text >    IV ANTIBIOTIC:  piperacillin/tazobactam IVPB.. 3.375 Gram(s) IV Intermittent every 8 hours

## 2023-06-14 NOTE — CHART NOTE - NSCHARTNOTEFT_GEN_A_CORE
Please note, I was present and rounded on this patient throughout the hospitalization and upon further review of the EMR the following diagnosis exists:     1. Patient was found to have diverticulitis with an associated intraabdominal abscess that was adjacent to and outside of the colon treated with antibiotics

## 2023-06-30 ENCOUNTER — APPOINTMENT (OUTPATIENT)
Dept: COLORECTAL SURGERY | Facility: CLINIC | Age: 68
End: 2023-06-30
Payer: MEDICARE

## 2023-06-30 VITALS
BODY MASS INDEX: 24.54 KG/M2 | WEIGHT: 133.38 LBS | HEART RATE: 91 BPM | RESPIRATION RATE: 14 BRPM | OXYGEN SATURATION: 100 % | HEIGHT: 62 IN | DIASTOLIC BLOOD PRESSURE: 71 MMHG | SYSTOLIC BLOOD PRESSURE: 127 MMHG | TEMPERATURE: 98.7 F

## 2023-06-30 DIAGNOSIS — K57.32 DIVERTICULITIS OF LARGE INTESTINE W/OUT PERFORATION OR ABSCESS W/OUT BLEEDING: ICD-10-CM

## 2023-06-30 PROCEDURE — 99215 OFFICE O/P EST HI 40 MIN: CPT

## 2023-06-30 NOTE — HISTORY OF PRESENT ILLNESS
[FreeTextEntry1] : 67-year-old female who presents for a follow-up visit.  She was a mated to [facility] when she presented with left-sided abdominal pain associated with rectal bleeding.  She was noted to have acute diverticulitis with a collection in the area concerning for either colon or gynecologic pathology.  An MRI showed reactive changes with no obvious pathology from a GYN standpoint.  She was discharged on oral antibiotics which she completed and is currently pain-free.  She has no further rectal bleeding.\par \par She has a history of a tubo-ovarian abscess back in 2014 that required ileocecectomy at the same time.\par \par No prior colonoscopy

## 2023-07-10 ENCOUNTER — NON-APPOINTMENT (OUTPATIENT)
Age: 68
End: 2023-07-10

## 2023-08-23 NOTE — ED ADULT NURSE NOTE - JUGULAR VENOUS DISTENTION
Voicemail from patient (from 8/4/23). Patient states he was seen at [facility] 8/3/23 and BP was 110/70. He is asking if he should d/c HCTZ and only take Bystolic? Also inquiring about starting the Cymbalta that was recommended a long time back. He wants to know if it would be ok to start taking it now. Please advise. absent

## 2023-10-05 ENCOUNTER — APPOINTMENT (OUTPATIENT)
Dept: CARDIOLOGY | Facility: CLINIC | Age: 68
End: 2023-10-05

## 2024-04-18 ENCOUNTER — RX RENEWAL (OUTPATIENT)
Age: 69
End: 2024-04-18

## 2024-05-23 ENCOUNTER — APPOINTMENT (OUTPATIENT)
Dept: CARDIOLOGY | Facility: CLINIC | Age: 69
End: 2024-05-23

## 2024-05-23 VITALS
HEART RATE: 80 BPM | BODY MASS INDEX: 26.5 KG/M2 | DIASTOLIC BLOOD PRESSURE: 76 MMHG | OXYGEN SATURATION: 100 % | WEIGHT: 144 LBS | HEIGHT: 62 IN | SYSTOLIC BLOOD PRESSURE: 134 MMHG

## 2024-05-23 DIAGNOSIS — Z91.89 OTHER SPECIFIED PERSONAL RISK FACTORS, NOT ELSEWHERE CLASSIFIED: ICD-10-CM

## 2024-05-23 DIAGNOSIS — E78.5 HYPERLIPIDEMIA, UNSPECIFIED: ICD-10-CM

## 2024-05-23 DIAGNOSIS — R07.89 OTHER CHEST PAIN: ICD-10-CM

## 2024-05-23 DIAGNOSIS — I10 ESSENTIAL (PRIMARY) HYPERTENSION: ICD-10-CM

## 2024-05-23 PROCEDURE — 93000 ELECTROCARDIOGRAM COMPLETE: CPT

## 2024-05-23 PROCEDURE — 99205 OFFICE O/P NEW HI 60 MIN: CPT | Mod: 1L

## 2024-05-23 PROCEDURE — 99406 BEHAV CHNG SMOKING 3-10 MIN: CPT | Mod: 1L

## 2024-05-23 PROCEDURE — G2211 COMPLEX E/M VISIT ADD ON: CPT

## 2024-05-23 RX ORDER — ATORVASTATIN CALCIUM 20 MG/1
20 TABLET, FILM COATED ORAL
Qty: 1 | Refills: 3 | Status: ACTIVE | COMMUNITY
Start: 1900-01-01 | End: 1900-01-01

## 2024-05-23 RX ORDER — AMLODIPINE BESYLATE 10 MG/1
10 TABLET ORAL DAILY
Qty: 90 | Refills: 3 | Status: ACTIVE | COMMUNITY
Start: 2021-08-27 | End: 1900-01-01

## 2024-05-23 RX ORDER — LOSARTAN POTASSIUM 100 MG/1
100 TABLET, FILM COATED ORAL DAILY
Qty: 90 | Refills: 3 | Status: ACTIVE | COMMUNITY
Start: 2022-05-10 | End: 1900-01-01

## 2024-05-23 NOTE — CARDIOLOGY SUMMARY
[de-identified] : 1/5/2023: Sinus  Rhythm \par  - Right bundle branch block.  [de-identified] : 07/13/2021 EF 55-60% mild MR, mildly enlarged RA

## 2024-05-23 NOTE — COUNSELING
[Encouraged to pick a quit date and identify support needed to quit] : Encouraged to pick a quit date and identify support needed to quit [FreeTextEntry1] : 3

## 2024-05-23 NOTE — HISTORY OF PRESENT ILLNESS
[FreeTextEntry1] : 67 year old female with history of HTN, HLD, active cigarette smoker and with family history of CAD (mother  of MI age 57) who is here for routine f/u due to risk factors.  Reports of feeling good. Currently smoking 5 sticks daily, hoping to quit by the end of the month. Denies chest pain, shortness of breath, palpitations, syncope or near syncope, orthopnea and PND. Compliant with medications. She works at [employer] 6 days/week, walks a lot but does not exercise. No activity intolerance. No leg edema. No recent labs.   2024 Presents today for 1 year follow up. She feels great. Semi-retired. Cut down to 4 cigarettes per day. Taking her medications without issues. No chest pain or shortness of breath. [place] and [place] [employer] [place].  Otherwise, denies orthopnea, paroxysmal nocturnal dyspnea, lower extremity edema, unexplained weight gain or dyspnea on exertion.  ECG RBBB no other evidence of ischemia.

## 2024-05-23 NOTE — PHYSICAL EXAM
[Well Developed] : well developed [Well Nourished] : well nourished [No Acute Distress] : no acute distress [Normal Conjunctiva] : normal conjunctiva [No Carotid Bruit] : no carotid bruit [Normal S1, S2] : normal S1, S2 [No Murmur] : no murmur [Clear Lung Fields] : clear lung fields [Good Air Entry] : good air entry [No Respiratory Distress] : no respiratory distress  [Soft] : abdomen soft [Non Tender] : non-tender [Normal Bowel Sounds] : normal bowel sounds [Normal Gait] : normal gait [No Edema] : no edema [Moves all extremities] : moves all extremities [Normal Speech] : normal speech [Alert and Oriented] : alert and oriented

## 2025-01-12 NOTE — DISCUSSION/SUMMARY
Detail Level: Detailed Field Number: 1 Lesion Dimensions-X Axis In Cm: 1.8 Lesion Dimensions-Y Axis In Cm: 3.3 Lesion Margin Size In Cm: 0.6 [Patient] : the patient Shield Size In Cm: 3.0 x 4.5 [Risks] : risks Applicator Size In Cm: 5.0 cm [Benefits] : benefits Energy (Kv): 70 [Alternatives] : alternatives Treatment Time (Min): 0.41 [___ Month(s)] : in [unfilled] month(s) Time, Dose, Fractionation Factor (Tdf) For Prescription 1: 104 [With ___] : with [unfilled] Daily Dose (Cgy): 275.11 [FreeTextEntry1] : 68 year old female with history of HTN, HLD, active cigarette smoker and with family history of CAD (mother  of MI age 57) who is here for routine f/u due to risk factors.  Reports of feeling good. Currently smoking 4 sticks daily, hoping to quit soon.  A/P:  1. HTN - well controlled.  - continue amlodipine, losartan 2. HLD - on atorvastatin 20 mg Q HS. Low chol diet. No recent labs, rechecking before next visit. 3. Echo on 2021  EF 55-60% , mildly enlarged RA, mild MR. No significant murmur on auscultation  4. Nuclear stress test on 2021, which negative for ischemia.  5. Smoking cessation discussed with patient, willing to quit 6. Advised to partake in at least 150 mins/week of moderate intensity exercise like brisk walking.    Number Of Fractions For Prescription 1: 21 [EKG obtained to assist in diagnosis and management of assessed problem(s)] : EKG obtained to assist in diagnosis and management of assessed problem(s) Treatments Per Week: 3 Total Dose For Prescription 1 (Cgy): 5777.31 Add A Second Prescription?: No Add Decay And Dose Adjustment Calculation?: Yes Treatment Date (Start): 11/12/2024 Treatment Date (Stop): 12/02/2024 Treatment Date (Resume): 12/16/2024 Tdf Of Fractions Delivered: 49.5 Decay Factor: 0.95 Adjusted Tdf: 47 New Total Tdf (Remaining Tdf + Adjusted Tdf): 96.5 Dose Decay Verbiage For Visit Day Calculation: Per the request of Dr. Tamara Parra dose decay calculation for this patient was performed due to break in treatment. Dose decay calculation is clinically necessary to determine appropriate changes to prescription, if necessary, for efficacy of treatment. \\n\\nPer Dr. Tamara Parra, a prescription change was made today due to the break in treatment dropping the TDF. An additional fraction has been added to bring the TDF back into a therapeutic range. Physics Consultation Performed For Fractions:: 19-21 Physics Documentation: Physician Orders: Plan: Medical Physics Checks: \\nPer the request of Dr. Tamara Parra, continuing medical physics review as per radiotherapy standard of care post every 5th fraction for patient, including assessment of treatment parameters,  of dose delivery, and review of patient treatment documentation in support of the provider, to ensure efficacy and continued safe delivery of radiotherapy. Included in physics check is review of patient setup information, all pertinent simulation and treatment photographs checks, prescription, dose calculation verification, per fraction dose charted correctly, elapsed days and treatment days correctly charted, cumulative dose correct, and review of any prescription changes. Patient was not present, nor was it necessary for the patient to be present for weekly physics review and no other superficial radiotherapy services were rendered on this day. Continued medical physics review post every 5th fraction of therapy is requested by provider for appropriate radiotherapy management and is deemed medically necessary and standard of care.

## 2025-04-02 ENCOUNTER — RX RENEWAL (OUTPATIENT)
Age: 70
End: 2025-04-02

## 2025-04-30 ENCOUNTER — RX RENEWAL (OUTPATIENT)
Age: 70
End: 2025-04-30

## 2025-05-19 ENCOUNTER — EMERGENCY (EMERGENCY)
Facility: HOSPITAL | Age: 70
LOS: 1 days | End: 2025-05-19
Attending: EMERGENCY MEDICINE
Payer: MEDICARE

## 2025-05-19 VITALS
SYSTOLIC BLOOD PRESSURE: 138 MMHG | OXYGEN SATURATION: 97 % | TEMPERATURE: 98 F | HEART RATE: 56 BPM | RESPIRATION RATE: 18 BRPM | DIASTOLIC BLOOD PRESSURE: 63 MMHG

## 2025-05-19 VITALS
SYSTOLIC BLOOD PRESSURE: 142 MMHG | DIASTOLIC BLOOD PRESSURE: 69 MMHG | WEIGHT: 143.3 LBS | TEMPERATURE: 99 F | HEART RATE: 92 BPM | OXYGEN SATURATION: 100 % | RESPIRATION RATE: 20 BRPM

## 2025-05-19 DIAGNOSIS — Z98.51 TUBAL LIGATION STATUS: Chronic | ICD-10-CM

## 2025-05-19 DIAGNOSIS — Z90.49 ACQUIRED ABSENCE OF OTHER SPECIFIED PARTS OF DIGESTIVE TRACT: Chronic | ICD-10-CM

## 2025-05-19 LAB
ALBUMIN SERPL ELPH-MCNC: 4.3 G/DL — SIGNIFICANT CHANGE UP (ref 3.3–5.2)
ALP SERPL-CCNC: 58 U/L — SIGNIFICANT CHANGE UP (ref 40–120)
ALT FLD-CCNC: 17 U/L — SIGNIFICANT CHANGE UP
ANION GAP SERPL CALC-SCNC: 15 MMOL/L — SIGNIFICANT CHANGE UP (ref 5–17)
APTT BLD: 27.3 SEC — SIGNIFICANT CHANGE UP (ref 26.1–36.8)
AST SERPL-CCNC: 25 U/L — SIGNIFICANT CHANGE UP
BASOPHILS # BLD AUTO: 0.02 K/UL — SIGNIFICANT CHANGE UP (ref 0–0.2)
BASOPHILS # BLD AUTO: 0.04 K/UL — SIGNIFICANT CHANGE UP (ref 0–0.2)
BASOPHILS NFR BLD AUTO: 0.4 % — SIGNIFICANT CHANGE UP (ref 0–2)
BASOPHILS NFR BLD AUTO: 0.7 % — SIGNIFICANT CHANGE UP (ref 0–2)
BILIRUB SERPL-MCNC: 0.2 MG/DL — LOW (ref 0.4–2)
BLD GP AB SCN SERPL QL: SIGNIFICANT CHANGE UP
BUN SERPL-MCNC: 16.3 MG/DL — SIGNIFICANT CHANGE UP (ref 8–20)
CALCIUM SERPL-MCNC: 9.6 MG/DL — SIGNIFICANT CHANGE UP (ref 8.4–10.5)
CHLORIDE SERPL-SCNC: 102 MMOL/L — SIGNIFICANT CHANGE UP (ref 96–108)
CO2 SERPL-SCNC: 21 MMOL/L — LOW (ref 22–29)
CREAT SERPL-MCNC: 0.83 MG/DL — SIGNIFICANT CHANGE UP (ref 0.5–1.3)
EGFR: 77 ML/MIN/1.73M2 — SIGNIFICANT CHANGE UP
EGFR: 77 ML/MIN/1.73M2 — SIGNIFICANT CHANGE UP
EOSINOPHIL # BLD AUTO: 0.08 K/UL — SIGNIFICANT CHANGE UP (ref 0–0.5)
EOSINOPHIL # BLD AUTO: 0.11 K/UL — SIGNIFICANT CHANGE UP (ref 0–0.5)
EOSINOPHIL NFR BLD AUTO: 1.4 % — SIGNIFICANT CHANGE UP (ref 0–6)
EOSINOPHIL NFR BLD AUTO: 1.9 % — SIGNIFICANT CHANGE UP (ref 0–6)
GLUCOSE SERPL-MCNC: 82 MG/DL — SIGNIFICANT CHANGE UP (ref 70–99)
HCT VFR BLD CALC: 34.8 % — SIGNIFICANT CHANGE UP (ref 34.5–45)
HCT VFR BLD CALC: 36.1 % — SIGNIFICANT CHANGE UP (ref 34.5–45)
HGB BLD-MCNC: 11.2 G/DL — LOW (ref 11.5–15.5)
HGB BLD-MCNC: 11.4 G/DL — LOW (ref 11.5–15.5)
IMM GRANULOCYTES # BLD AUTO: 0.02 K/UL — SIGNIFICANT CHANGE UP (ref 0–0.07)
IMM GRANULOCYTES # BLD AUTO: 0.02 K/UL — SIGNIFICANT CHANGE UP (ref 0–0.07)
IMM GRANULOCYTES NFR BLD AUTO: 0.3 % — SIGNIFICANT CHANGE UP (ref 0–0.9)
IMM GRANULOCYTES NFR BLD AUTO: 0.4 % — SIGNIFICANT CHANGE UP (ref 0–0.9)
INR BLD: 0.94 RATIO — SIGNIFICANT CHANGE UP (ref 0.85–1.16)
LACTATE BLDV-MCNC: 1.7 MMOL/L — SIGNIFICANT CHANGE UP (ref 0.5–2)
LIDOCAIN IGE QN: 26 U/L — SIGNIFICANT CHANGE UP (ref 22–51)
LYMPHOCYTES # BLD AUTO: 1.55 K/UL — SIGNIFICANT CHANGE UP (ref 1–3.3)
LYMPHOCYTES # BLD AUTO: 2.16 K/UL — SIGNIFICANT CHANGE UP (ref 1–3.3)
LYMPHOCYTES NFR BLD AUTO: 27.3 % — SIGNIFICANT CHANGE UP (ref 13–44)
LYMPHOCYTES NFR BLD AUTO: 37 % — SIGNIFICANT CHANGE UP (ref 13–44)
MCHC RBC-ENTMCNC: 29.5 PG — SIGNIFICANT CHANGE UP (ref 27–34)
MCHC RBC-ENTMCNC: 29.6 PG — SIGNIFICANT CHANGE UP (ref 27–34)
MCHC RBC-ENTMCNC: 31.6 G/DL — LOW (ref 32–36)
MCHC RBC-ENTMCNC: 32.2 G/DL — SIGNIFICANT CHANGE UP (ref 32–36)
MCV RBC AUTO: 91.8 FL — SIGNIFICANT CHANGE UP (ref 80–100)
MCV RBC AUTO: 93.3 FL — SIGNIFICANT CHANGE UP (ref 80–100)
MONOCYTES # BLD AUTO: 0.37 K/UL — SIGNIFICANT CHANGE UP (ref 0–0.9)
MONOCYTES # BLD AUTO: 0.38 K/UL — SIGNIFICANT CHANGE UP (ref 0–0.9)
MONOCYTES NFR BLD AUTO: 6.5 % — SIGNIFICANT CHANGE UP (ref 2–14)
MONOCYTES NFR BLD AUTO: 6.5 % — SIGNIFICANT CHANGE UP (ref 2–14)
NEUTROPHILS # BLD AUTO: 3.12 K/UL — SIGNIFICANT CHANGE UP (ref 1.8–7.4)
NEUTROPHILS # BLD AUTO: 3.64 K/UL — SIGNIFICANT CHANGE UP (ref 1.8–7.4)
NEUTROPHILS NFR BLD AUTO: 53.6 % — SIGNIFICANT CHANGE UP (ref 43–77)
NEUTROPHILS NFR BLD AUTO: 64 % — SIGNIFICANT CHANGE UP (ref 43–77)
NRBC # BLD AUTO: 0 K/UL — SIGNIFICANT CHANGE UP (ref 0–0)
NRBC # BLD AUTO: 0 K/UL — SIGNIFICANT CHANGE UP (ref 0–0)
NRBC # FLD: 0 K/UL — SIGNIFICANT CHANGE UP (ref 0–0)
NRBC # FLD: 0 K/UL — SIGNIFICANT CHANGE UP (ref 0–0)
NRBC BLD AUTO-RTO: 0 /100 WBCS — SIGNIFICANT CHANGE UP (ref 0–0)
NRBC BLD AUTO-RTO: 0 /100 WBCS — SIGNIFICANT CHANGE UP (ref 0–0)
PLATELET # BLD AUTO: 320 K/UL — SIGNIFICANT CHANGE UP (ref 150–400)
PLATELET # BLD AUTO: 348 K/UL — SIGNIFICANT CHANGE UP (ref 150–400)
PMV BLD: 9 FL — SIGNIFICANT CHANGE UP (ref 7–13)
PMV BLD: 9.1 FL — SIGNIFICANT CHANGE UP (ref 7–13)
POTASSIUM SERPL-MCNC: 4.7 MMOL/L — SIGNIFICANT CHANGE UP (ref 3.5–5.3)
POTASSIUM SERPL-SCNC: 4.7 MMOL/L — SIGNIFICANT CHANGE UP (ref 3.5–5.3)
PROT SERPL-MCNC: 7.2 G/DL — SIGNIFICANT CHANGE UP (ref 6.6–8.7)
PROTHROM AB SERPL-ACNC: 10.9 SEC — SIGNIFICANT CHANGE UP (ref 9.9–13.4)
RBC # BLD: 3.79 M/UL — LOW (ref 3.8–5.2)
RBC # BLD: 3.87 M/UL — SIGNIFICANT CHANGE UP (ref 3.8–5.2)
RBC # FLD: 12.6 % — SIGNIFICANT CHANGE UP (ref 10.3–14.5)
RBC # FLD: 12.7 % — SIGNIFICANT CHANGE UP (ref 10.3–14.5)
SODIUM SERPL-SCNC: 138 MMOL/L — SIGNIFICANT CHANGE UP (ref 135–145)
WBC # BLD: 5.68 K/UL — SIGNIFICANT CHANGE UP (ref 3.8–10.5)
WBC # BLD: 5.83 K/UL — SIGNIFICANT CHANGE UP (ref 3.8–10.5)
WBC # FLD AUTO: 5.68 K/UL — SIGNIFICANT CHANGE UP (ref 3.8–10.5)
WBC # FLD AUTO: 5.83 K/UL — SIGNIFICANT CHANGE UP (ref 3.8–10.5)

## 2025-05-19 PROCEDURE — 83605 ASSAY OF LACTIC ACID: CPT

## 2025-05-19 PROCEDURE — 80053 COMPREHEN METABOLIC PANEL: CPT

## 2025-05-19 PROCEDURE — 86850 RBC ANTIBODY SCREEN: CPT

## 2025-05-19 PROCEDURE — 85025 COMPLETE CBC W/AUTO DIFF WBC: CPT

## 2025-05-19 PROCEDURE — 99284 EMERGENCY DEPT VISIT MOD MDM: CPT | Mod: 25

## 2025-05-19 PROCEDURE — 36415 COLL VENOUS BLD VENIPUNCTURE: CPT

## 2025-05-19 PROCEDURE — 83690 ASSAY OF LIPASE: CPT

## 2025-05-19 PROCEDURE — 86901 BLOOD TYPING SEROLOGIC RH(D): CPT

## 2025-05-19 PROCEDURE — 74178 CT ABD&PLV WO CNTR FLWD CNTR: CPT | Mod: 26

## 2025-05-19 PROCEDURE — 85730 THROMBOPLASTIN TIME PARTIAL: CPT

## 2025-05-19 PROCEDURE — 85610 PROTHROMBIN TIME: CPT

## 2025-05-19 PROCEDURE — 99285 EMERGENCY DEPT VISIT HI MDM: CPT

## 2025-05-19 PROCEDURE — 86900 BLOOD TYPING SEROLOGIC ABO: CPT

## 2025-05-19 PROCEDURE — 74178 CT ABD&PLV WO CNTR FLWD CNTR: CPT

## 2025-05-19 RX ORDER — AMOXICILLIN AND CLAVULANATE POTASSIUM 500; 125 MG/1; MG/1
1 TABLET, FILM COATED ORAL
Qty: 20 | Refills: 0
Start: 2025-05-19 | End: 2025-05-28

## 2025-05-19 RX ORDER — AMOXICILLIN AND CLAVULANATE POTASSIUM 500; 125 MG/1; MG/1
1 TABLET, FILM COATED ORAL ONCE
Refills: 0 | Status: COMPLETED | OUTPATIENT
Start: 2025-05-19 | End: 2025-05-19

## 2025-05-19 RX ADMIN — AMOXICILLIN AND CLAVULANATE POTASSIUM 1 TABLET(S): 500; 125 TABLET, FILM COATED ORAL at 11:31

## 2025-05-19 NOTE — ED ADULT TRIAGE NOTE - CHIEF COMPLAINT QUOTE
From home c/o abdominal discomfort & rectal bleeding started yesterday & another one this morning, Hx of hemorrhoids & HTN patient also said that 5 yeards ago this symptoms happened to her & went to a intestinal surgery. Not in any Blood Thinner

## 2025-05-19 NOTE — ED PROVIDER NOTE - PROGRESS NOTE DETAILS
AJM: pre received in signout. no bleeding in ED. ct neg for GIB. h/ stable. + colitis vs diverticulitis. given history of div abscess in the past will treat with abx and gi follow up. All results discussed with the patient, and a copy of results has been provided. Patient is comfortable with dc plan for home. Opportunity for questions was provided and all questions have been addressed. Patient understands when to return to ED if symptoms worsen.

## 2025-05-19 NOTE — ED PROVIDER NOTE - PATIENT PORTAL LINK FT
You can access the FollowMyHealth Patient Portal offered by [facility] by registering at the following website: [email or web address]. By joining [facility]’s FollowMyHealth portal, you will also be able to view your health information using other applications (apps) compatible with our system.

## 2025-05-19 NOTE — ED ADULT NURSE REASSESSMENT NOTE - NS ED NURSE REASSESS COMMENT FT1
assumed care of pt from RN Virgen, pt AAOX4, resp. even and unlabored on RA, pt NSR on CM, pt endorses rectal bleeding x2 days with mixed dark red blood and bright red blood, pt denies abd pain/cramping/N/V/fever/chills/SOB/CP/HA/dizziness/vision changes, lab drawn and IV placed per MD orders, pt awaiting CT of the abd., pt educated about plan of care, pt demonstrates understanding through use of teach back demonstration, safety maintained.

## 2025-05-19 NOTE — ED ADULT TRIAGE NOTE - TEMPERATURE IN CELSIUS (DEGREES C)
37.1
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  Pt seen at [facility] and had strep, flu, covid neg.  Pt referred to ED secondary to hx of myocarditis.  In ED pt denying active cp, dyspnea.  No recent cough or congestion.  Able to tolerate liquids and solids.  Pt did not take anything for fever or pain.    VS febrile in .9, tachy resolved with fluids and IV toradol/Tylenol  WBC 17 trop neg  Pt feels better with toradol, steroids in ED  Will dc with augmentin  Understands return precautions and possible PTA symptoms

## 2025-05-19 NOTE — ED PROVIDER NOTE - OBJECTIVE STATEMENT
69-year-old female past medical history of hypertension hyperlipidemia presents with 2 episodes of hematochezia since yesterday with left lower quadrant abdominal pain.  Patient states she is having both blood in the toilet and blood in the stool.  No anticoagulation including no aspirin.  No lightheadedness no dizziness no reported bleeding from elsewhere.  Of note patient did have an admission in 2023 for similar symptoms found to have a diverticular abscess admitted to colorectal surgery and placed on IV antibiotics.  Also has a history of an ex lap for TOA that required ileocecectomy and anastomosis in 2014.

## 2025-05-19 NOTE — ED PROVIDER NOTE - PHYSICAL EXAMINATION
PHYSICAL EXAM:   General: well-appearing, appears stated age, not in extremis   HEENT: NC/AT, , airway patent  Cardiovascular: regular rate   Respiratory: nonlabored respirations, saturating well on RA  Abdominal: soft, mild LLQ/L pelvic TTP, nondistended, no rebound, guarding or rigidity  Rectal: +external nonthrombosed nonbleeding nontender external hemorrhoids, no profuse rectal bleeding on exam  Psychiatric: appropriate mood and affect.   -Guerita Hatch MD Attending Physician

## 2025-05-19 NOTE — ED ADULT NURSE NOTE - OBJECTIVE STATEMENT
pt comes into ED A&Ox4, c/o lower abdominal pain and discomfort w slight bleeding noted that started yesterday and had another episode this morning. pt takes ASA daily. hx of hemorrhoids and HTN. pt breathing even and unlabored on room air. no other complaints of pain or discomfort at this time.

## 2025-05-19 NOTE — ED PROVIDER NOTE - NSFOLLOWUPINSTRUCTIONS_ED_ALL_ED_FT
Diverticulitis is infection or inflammation of small pouches (diverticula) in the colon that form due to a condition called diverticulosis. Diverticula can trap stool (feces) and bacteria, causing infection and inflammation.    Diverticulitis may cause severe stomach pain and diarrhea. It may lead to tissue damage in the colon that causes bleeding or blockage. The diverticula may also burst (rupture) and cause infected stool to enter other areas of the abdomen.    What are the causes?  This condition is caused by stool becoming trapped in the diverticula, which allows bacteria to grow in the diverticula. This leads to inflammation and infection.    What increases the risk?  You are more likely to develop this condition if you have diverticulosis. The risk increases if you:    Are overweight or obese.  Do not get enough exercise.  Drink alcohol.  Use tobacco products.  Eat a diet that has a lot of red meat such as beef, pork, or lamb.  Eat a diet that does not include enough fiber. High-fiber foods include fruits, vegetables, beans, nuts, and whole grains.  Are over 40 years of age.    What are the signs or symptoms?  Symptoms of this condition may include:    Pain and tenderness in the abdomen. The pain is normally located on the left side of the abdomen, but it may occur in other areas.  Fever and chills.  Nausea.  Vomiting.  Cramping.  Bloating.  Changes in bowel routines.  Blood in your stool.    How is this diagnosed?  This condition is diagnosed based on:    Your medical history.  A physical exam.  Tests to make sure there is nothing else causing your condition. These tests may include:    Blood tests.  Urine tests.  CT scan of the abdomen.    How is this treated?  Most cases of this condition are mild and can be treated at home. Treatment may include:    Taking over-the-counter pain medicines.  Following a clear liquid diet.  Taking antibiotic medicines by mouth.  Resting.    More severe cases may need to be treated at a hospital. Treatment may include:    Not eating or drinking.  Taking prescription pain medicine.  Receiving antibiotic medicines through an IV.  Receiving fluids and nutrition through an IV.  Surgery.    When your condition is under control, your health care provider may recommend that you have a colonoscopy. This is an exam to look at the entire large intestine. During the exam, a lubricated, bendable tube is inserted into the anus and then passed into the rectum, colon, and other parts of the large intestine. A colonoscopy can show how severe your diverticula are and whether something else may be causing your symptoms.    Follow these instructions at home:      Medicines    Take over-the-counter and prescription medicines only as told by your health care provider. These include fiber supplements, probiotics, and stool softeners.  If you were prescribed an antibiotic medicine, take it as told by your health care provider. Do not stop taking the antibiotic even if you start to feel better.  Ask your health care provider if the medicine prescribed to you requires you to avoid driving or using machinery.        Eating and drinking     Follow a full liquid diet or another diet as directed by your health care provider.  After your symptoms improve, your health care provider may tell you to change your diet. He or she may recommend that you eat a diet that contains at least 25 grams (25 g) of fiber daily. Fiber makes it easier to pass stool. Healthy sources of fiber include:    Berries. One cup contains 4–8 grams of fiber.  Beans or lentils. One-half cup contains 5–8 grams of fiber.  Green vegetables. One cup contains 4 grams of fiber.  Avoid eating red meat.        General instructions    Do not use any products that contain nicotine or tobacco, such as cigarettes, e-cigarettes, and chewing tobacco. If you need help quitting, ask your health care provider.  Exercise for at least 30 minutes, 3 times each week. You should exercise hard enough to raise your heart rate and break a sweat.  Keep all follow-up visits as told by your health care provider. This is important. You may need to have a colonoscopy.    Contact a health care provider if:  Your pain does not improve.  Your bowel movements do not return to normal.    Get help right away if:  Your pain gets worse.  Your symptoms do not get better with treatment.  Your symptoms suddenly get worse.  You have a fever.  You vomit more than one time.  You have stools that are bloody, black, or tarry.    Summary  Diverticulitis is infection or inflammation of small pouches (diverticula) in the colon that form due to a condition called diverticulosis. Diverticula can trap stool (feces) and bacteria, causing infection and inflammation.  You are at higher risk for this condition if you have diverticulosis and you eat a diet that does not include enough fiber.  Most cases of this condition are mild and can be treated at home. More severe cases may need to be treated at a hospital.  When your condition is under control, your health care provider may recommend that you have an exam called a colonoscopy. This exam can show how severe your diverticula are and whether something else may be causing your symptoms.  Keep all follow-up visits as told by your health care provider. This is important.    ADDITIONAL NOTES AND INSTRUCTIONS    Please follow up with your Primary MD in 24-48 hr.  Seek immediate medical care for any new/worsening signs or symptoms.

## 2025-05-24 DIAGNOSIS — I10 ESSENTIAL (PRIMARY) HYPERTENSION: ICD-10-CM

## 2025-05-24 DIAGNOSIS — R10.32 LEFT LOWER QUADRANT PAIN: ICD-10-CM

## 2025-05-24 DIAGNOSIS — K57.32 DIVERTICULITIS OF LARGE INTESTINE WITHOUT PERFORATION OR ABSCESS WITHOUT BLEEDING: ICD-10-CM

## 2025-05-24 DIAGNOSIS — K64.4 RESIDUAL HEMORRHOIDAL SKIN TAGS: ICD-10-CM

## 2025-05-24 DIAGNOSIS — E78.5 HYPERLIPIDEMIA, UNSPECIFIED: ICD-10-CM

## 2025-06-19 ENCOUNTER — APPOINTMENT (OUTPATIENT)
Dept: CARDIOLOGY | Facility: CLINIC | Age: 70
End: 2025-06-19
Payer: MEDICARE

## 2025-06-19 VITALS
WEIGHT: 141 LBS | OXYGEN SATURATION: 99 % | HEART RATE: 97 BPM | BODY MASS INDEX: 25.95 KG/M2 | HEIGHT: 62 IN | SYSTOLIC BLOOD PRESSURE: 120 MMHG | DIASTOLIC BLOOD PRESSURE: 68 MMHG

## 2025-06-19 PROBLEM — F17.210 CIGARETTE SMOKER: Status: ACTIVE | Noted: 2025-06-19

## 2025-06-19 PROCEDURE — 99406 BEHAV CHNG SMOKING 3-10 MIN: CPT

## 2025-06-19 PROCEDURE — 99214 OFFICE O/P EST MOD 30 MIN: CPT | Mod: 25

## 2025-06-19 PROCEDURE — G2211 COMPLEX E/M VISIT ADD ON: CPT

## 2025-06-19 PROCEDURE — 93000 ELECTROCARDIOGRAM COMPLETE: CPT

## 2025-07-03 ENCOUNTER — RX RENEWAL (OUTPATIENT)
Age: 70
End: 2025-07-03

## 2025-08-28 ENCOUNTER — RX RENEWAL (OUTPATIENT)
Age: 70
End: 2025-08-28